# Patient Record
Sex: FEMALE | Race: WHITE | NOT HISPANIC OR LATINO
[De-identification: names, ages, dates, MRNs, and addresses within clinical notes are randomized per-mention and may not be internally consistent; named-entity substitution may affect disease eponyms.]

---

## 2017-08-03 ENCOUNTER — APPOINTMENT (OUTPATIENT)
Dept: OTOLARYNGOLOGY | Facility: CLINIC | Age: 37
End: 2017-08-03

## 2019-04-30 ENCOUNTER — TRANSCRIPTION ENCOUNTER (OUTPATIENT)
Age: 39
End: 2019-04-30

## 2021-05-16 ENCOUNTER — TRANSCRIPTION ENCOUNTER (OUTPATIENT)
Age: 41
End: 2021-05-16

## 2021-08-10 ENCOUNTER — TRANSCRIPTION ENCOUNTER (OUTPATIENT)
Age: 41
End: 2021-08-10

## 2022-03-29 ENCOUNTER — TRANSCRIPTION ENCOUNTER (OUTPATIENT)
Age: 42
End: 2022-03-29

## 2022-05-29 ENCOUNTER — NON-APPOINTMENT (OUTPATIENT)
Age: 42
End: 2022-05-29

## 2022-09-26 ENCOUNTER — EMERGENCY (EMERGENCY)
Facility: HOSPITAL | Age: 42
LOS: 1 days | Discharge: HOME | End: 2022-09-26
Attending: EMERGENCY MEDICINE | Admitting: EMERGENCY MEDICINE
Payer: SELF-PAY

## 2022-09-26 VITALS
OXYGEN SATURATION: 99 % | HEART RATE: 67 BPM | SYSTOLIC BLOOD PRESSURE: 120 MMHG | DIASTOLIC BLOOD PRESSURE: 81 MMHG | RESPIRATION RATE: 18 BRPM | TEMPERATURE: 98 F

## 2022-09-26 VITALS
TEMPERATURE: 98 F | DIASTOLIC BLOOD PRESSURE: 72 MMHG | SYSTOLIC BLOOD PRESSURE: 107 MMHG | OXYGEN SATURATION: 98 % | RESPIRATION RATE: 14 BRPM | WEIGHT: 229.94 LBS | HEIGHT: 64 IN | HEART RATE: 82 BPM

## 2022-09-26 DIAGNOSIS — Z98.89 OTHER SPECIFIED POSTPROCEDURAL STATES: Chronic | ICD-10-CM

## 2022-09-26 PROCEDURE — 99284 EMERGENCY DEPT VISIT MOD MDM: CPT | Mod: 25

## 2022-09-26 PROCEDURE — 73090 X-RAY EXAM OF FOREARM: CPT

## 2022-09-26 PROCEDURE — 73090 X-RAY EXAM OF FOREARM: CPT | Mod: 26,LT

## 2022-09-26 PROCEDURE — 96376 TX/PRO/DX INJ SAME DRUG ADON: CPT | Mod: XU

## 2022-09-26 PROCEDURE — 96374 THER/PROPH/DIAG INJ IV PUSH: CPT | Mod: XU

## 2022-09-26 PROCEDURE — 29125 APPL SHORT ARM SPLINT STATIC: CPT | Mod: RT

## 2022-09-26 PROCEDURE — 73110 X-RAY EXAM OF WRIST: CPT | Mod: 26,RT

## 2022-09-26 PROCEDURE — 99284 EMERGENCY DEPT VISIT MOD MDM: CPT | Mod: 57

## 2022-09-26 PROCEDURE — 25600 CLTX DST RDL FX/EPHYS SEP WO: CPT | Mod: 54

## 2022-09-26 PROCEDURE — 73110 X-RAY EXAM OF WRIST: CPT

## 2022-09-26 RX ORDER — OXYCODONE AND ACETAMINOPHEN 5; 325 MG/1; MG/1
1 TABLET ORAL
Qty: 16 | Refills: 0
Start: 2022-09-26 | End: 2022-09-29

## 2022-09-26 RX ORDER — MORPHINE SULFATE 50 MG/1
4 CAPSULE, EXTENDED RELEASE ORAL ONCE
Refills: 0 | Status: DISCONTINUED | OUTPATIENT
Start: 2022-09-26 | End: 2022-09-26

## 2022-09-26 RX ADMIN — MORPHINE SULFATE 4 MILLIGRAM(S): 50 CAPSULE, EXTENDED RELEASE ORAL at 13:00

## 2022-09-26 RX ADMIN — MORPHINE SULFATE 4 MILLIGRAM(S): 50 CAPSULE, EXTENDED RELEASE ORAL at 14:47

## 2022-09-26 RX ADMIN — MORPHINE SULFATE 4 MILLIGRAM(S): 50 CAPSULE, EXTENDED RELEASE ORAL at 14:04

## 2022-09-26 RX ADMIN — MORPHINE SULFATE 4 MILLIGRAM(S): 50 CAPSULE, EXTENDED RELEASE ORAL at 13:20
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
## 2022-09-26 NOTE — ED PROVIDER NOTE - GASTROINTESTINAL, MLM
Abdomen soft, non-tender, no guarding. No ecchymosis noted. No CVAT B. multiple soft and non tender abdominal hernias noted

## 2022-09-26 NOTE — ED PROVIDER NOTE - MUSCULOSKELETAL, MLM
++ttp with deformity and swelling noted to right distal radius with LROM secondary to pain, skin intact, fingers warm & mobile, nl cap refill, R shoulder/elbow/hand NT with FROM, distal pulses and sensation intact, NVI

## 2022-09-26 NOTE — ED PROVIDER NOTE - PATIENT PORTAL LINK FT
You can access the FollowMyHealth Patient Portal offered by [facility] by registering at the following website: [email or web address]. By joining [facility]’s FollowMyHealth portal, you will also be able to view your health information using other applications (apps) compatible with our system.
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
## 2022-09-26 NOTE — ED PROVIDER NOTE - NS ED ATTENDING STATEMENT MOD
This was a shared visit with the AG. I reviewed and verified the documentation and independently performed the documented:

## 2022-09-26 NOTE — ED PROVIDER NOTE - PROGRESS NOTE DETAILS
Reevaluated patient at bedside.  Patient feeling much improved.  Discussed the results of all diagnostic testing in ED and copies of all reports given.   An opportunity to ask questions was given.  Discussed the importance of prompt, close medical follow-up.  Patient will return with any changes, concerns or persistent / worsening symptoms.  Understanding of all instructions verbalized. Spoke to Dr. Ferris, ortho who reviewed xrays and advised to place wrist in volar splint, advised to d/c and will call pt to f/u in office today.   Reevaluated patient at bedside.  Patient feeling much improved. Discussed the results of all diagnostic testing in ED and copies of all reports given. R wrist placed in volar splint and sling, advised NWB R arm, elevate, ice compresses, will rx pain meds, f/u ortho.  An opportunity to ask questions was given.  Discussed the importance of prompt, close medical follow-up.  Patient will return with any changes, concerns or persistent / worsening symptoms.  Understanding of all instructions verbalized.

## 2022-09-26 NOTE — ED PROVIDER NOTE - NSICDXPASTSURGICALHX_GEN_ALL_CORE_FT
PAST SURGICAL HISTORY:  Disorder of tendon Right leg tendon transfer, 1987    Heel cord tightness repair of muscle cut in 1988    History of hernia surgery     History of tonsillectomy bilateral M&T, 1990    Other disorders of bone development and growth Left leg surgery to slow growth of leg to equalize the leg length of right leg, 1988    Status post breast lumpectomy right

## 2022-09-26 NOTE — ED PROVIDER NOTE - NSFOLLOWUPINSTRUCTIONS_ED_ALL_ED_FT
Follow-up with orthopedist for reevaluation, ongoing care and treatment.  Rest, nonweightbearing right arm.  Elevate arm with sling.  Take pain medication as prescribed.  Ice compresses.  If having worsening of symptoms or other related symptoms, return to the ER immediately.    Wrist Fracture Treated With Immobilization       A wrist fracture is a break or crack in one of the bones of your wrist. Your wrist is made of eight small bones at the palm of your hand (carpal bones) and two long bones of the forearm (radius and ulna).    A broken wrist is often treated by wearing a cast, splint, or sling (immobilization). This holds the broken pieces in place so they can heal.      What are the causes?    •A direct force to the wrist.      •Trauma, such as a car crash or falling on an outstretched hand.        What increases the risk?    •Doing contact sports or high-risk sports such as skiing, biking, and ice skating.      •Taking steroid medicines.      •Smoking.      •Being female.      •Being .      •Drinking more than three drinks that contain alcohol a day.      •Having a condition that weakens the bones (osteoporosis).      •Being older.      •Having had other broken bones in the past.        What are the signs or symptoms?    •Pain.       •Swelling.       •Bruising.       •Not being able to move the wrist normally.      •The wrist hanging in an odd position or looking oddly shaped.        How is this treated?    Treatment involves wearing a cast, splint, or sling. You may also need to take pain medicine. Once the injury has healed enough, you may begin doing range-of-motion exercises.      Follow these instructions at home:    If you have a cast:     • Do not stick anything inside the cast to scratch your skin.      •Check the skin around the cast every day. Tell your doctor if you see problems.      •You may put lotion on dry skin around the cast. Do not put lotion on the skin under the cast.      •Keep the cast clean and dry.      If you have a splint or sling:     •Wear the splint or sling as told by your doctor. Take it off only as told by your doctor.    •Loosen the splint or sling if your fingers:  •Tingle.      •Become numb.      •Turn cold and blue.        •Keep the splint or sling clean and dry.      Bathing     • Do not take baths, swim, or use a hot tub. Ask your doctor about taking showers or sponge baths.    •If your cast, splint, or sling is not waterproof:   •Do not let it get wet.      •Cover it with a watertight covering when you take a bath or shower.        •If you have a sling, take it off for bathing only if your doctor says this is okay.        Managing pain, stiffness, and swelling    •If told, put ice on the injured area. To do this:  •If you have a removable splint or sling, take it off as told by your doctor.      •Put ice in a plastic bag.      •Place a towel between your skin and the bag or between your cast and the bag.      •Leave the ice on for 20 minutes, 2–3 times a day.      •Take off the ice if your skin turns bright red. This is very important. If you cannot feel pain, heat, or cold, you have a greater risk of damage to the area.        •Move your fingers often.      •Raise the injured area above the level of your heart while you are sitting or lying down.      Activity     •Return to your normal activities when your doctor says that it is safe.      • Do not lift with or put weight on your injured wrist until your doctor says this is okay.      •Ask your doctor when it is safe to drive if you have a cast, splint, or sling on your wrist.      •Do range-of-motion exercises only as told by your doctor.      Medicines     •Take over-the-counter and prescription medicines only as told by your doctor.      •Ask your doctor if you should avoid driving or using machines while you are taking your medicine.      General instructions     • Do not put pressure on any part of the cast or splint until it is fully hardened.      • Do not smoke or use any products that contain nicotine or tobacco. If you need help quitting, ask your doctor.    •If told, take steps to prevent problems with pooping (constipation). You may need to:  •Drink enough fluid to keep your pee (urine) pale yellow.      •Take medicines. You will be told what medicines to take.      •Eat foods that are high in fiber. These include beans, whole grains, and fresh fruits and vegetables.      •Limit foods that are high in fat and sugar. These include fried or sweet foods.        •Keep all follow-up visits.        Contact a doctor if:    •Your cast, splint, or sling is damaged or loose.      •You have any new pain, swelling, or bruising.      •Your pain, swelling, and bruising do not get better.      •You have a fever.      •You have chills.        Get help right away if:    •Your skin or fingers on your injured arm turn blue or gray.      •Your arm feels cold or gets numb.      •You have very bad pain in your injured wrist.        Summary    •A wrist fracture is a break or crack in one of the bones of your wrist.      •A broken wrist is often treated by wearing a cast, splint, or sling.      •You should check the skin around a cast every day.      •Take off a splint or sling only as told by your doctor.      This information is not intended to replace advice given to you by your health care provider. Make sure you discuss any questions you have with your health care provider.

## 2022-09-26 NOTE — ED PROVIDER NOTE - NSICDXPASTMEDICALHX_GEN_ALL_CORE_FT
PAST MEDICAL HISTORY:  Abdominal hernia     Anemia     Breast cancer Right lumpectomy 6/2014, S/P radiation, last dose, 08/2014    Breast cancer right    Celiac disease     History of celiac disease     Inflammatory spondylopathy     Leiomyosarcoma of lower extremity, left left ankle    Obesity     Polio 5y/o
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
## 2022-09-26 NOTE — ED PROVIDER NOTE - CARE PROVIDER_API CALL
Juan Carlos Ferris)  Orthopaedic Surgery  [address] [place] [address], [address]  [place] [address]  Phone: [phone]  Fax: [phone]  Follow Up Time: 1-3 Days

## 2022-09-26 NOTE — ED PROCEDURE NOTE - ATTENDING APP SHARED VISIT CONTRIBUTION OF CARE
Was available for key portions of the procedure and agree with above patient is neurovascular intact pre and postprocedure

## 2023-11-18 ENCOUNTER — INPATIENT (INPATIENT)
Facility: HOSPITAL | Age: 43
LOS: 2 days | Discharge: HOME | DRG: 202 | End: 2023-11-21
Attending: INTERNAL MEDICINE | Admitting: INTERNAL MEDICINE
Payer: COMMERCIAL

## 2023-11-18 VITALS
OXYGEN SATURATION: 95 % | WEIGHT: 250 LBS | HEART RATE: 106 BPM | SYSTOLIC BLOOD PRESSURE: 134 MMHG | HEIGHT: 64 IN | RESPIRATION RATE: 20 BRPM | TEMPERATURE: 98 F | DIASTOLIC BLOOD PRESSURE: 83 MMHG

## 2023-11-18 DIAGNOSIS — J45.901 UNSPECIFIED ASTHMA WITH (ACUTE) EXACERBATION: ICD-10-CM

## 2023-11-18 DIAGNOSIS — Z98.89 OTHER SPECIFIED POSTPROCEDURAL STATES: Chronic | ICD-10-CM

## 2023-11-18 LAB
ALBUMIN SERPL ELPH-MCNC: 3.3 G/DL — SIGNIFICANT CHANGE UP (ref 3.3–5)
ALBUMIN SERPL ELPH-MCNC: 3.3 G/DL — SIGNIFICANT CHANGE UP (ref 3.3–5)
ALP SERPL-CCNC: 106 U/L — SIGNIFICANT CHANGE UP (ref 40–120)
ALP SERPL-CCNC: 106 U/L — SIGNIFICANT CHANGE UP (ref 40–120)
ALT FLD-CCNC: 21 U/L — SIGNIFICANT CHANGE UP (ref 12–78)
ALT FLD-CCNC: 21 U/L — SIGNIFICANT CHANGE UP (ref 12–78)
ANION GAP SERPL CALC-SCNC: 5 MMOL/L — SIGNIFICANT CHANGE UP (ref 5–17)
ANION GAP SERPL CALC-SCNC: 5 MMOL/L — SIGNIFICANT CHANGE UP (ref 5–17)
APPEARANCE UR: CLEAR — SIGNIFICANT CHANGE UP
APPEARANCE UR: CLEAR — SIGNIFICANT CHANGE UP
APTT BLD: 28.7 SEC — SIGNIFICANT CHANGE UP (ref 24.5–35.6)
APTT BLD: 28.7 SEC — SIGNIFICANT CHANGE UP (ref 24.5–35.6)
AST SERPL-CCNC: 13 U/L — LOW (ref 15–37)
AST SERPL-CCNC: 13 U/L — LOW (ref 15–37)
BASE EXCESS BLDA CALC-SCNC: -0.1 MMOL/L — SIGNIFICANT CHANGE UP (ref -2–3)
BASE EXCESS BLDA CALC-SCNC: -0.1 MMOL/L — SIGNIFICANT CHANGE UP (ref -2–3)
BASOPHILS # BLD AUTO: 0.06 K/UL — SIGNIFICANT CHANGE UP (ref 0–0.2)
BASOPHILS # BLD AUTO: 0.06 K/UL — SIGNIFICANT CHANGE UP (ref 0–0.2)
BASOPHILS NFR BLD AUTO: 0.3 % — SIGNIFICANT CHANGE UP (ref 0–2)
BASOPHILS NFR BLD AUTO: 0.3 % — SIGNIFICANT CHANGE UP (ref 0–2)
BILIRUB SERPL-MCNC: 0.2 MG/DL — SIGNIFICANT CHANGE UP (ref 0.2–1.2)
BILIRUB SERPL-MCNC: 0.2 MG/DL — SIGNIFICANT CHANGE UP (ref 0.2–1.2)
BILIRUB UR-MCNC: NEGATIVE — SIGNIFICANT CHANGE UP
BILIRUB UR-MCNC: NEGATIVE — SIGNIFICANT CHANGE UP
BLOOD GAS COMMENTS ARTERIAL: SIGNIFICANT CHANGE UP
BLOOD GAS COMMENTS ARTERIAL: SIGNIFICANT CHANGE UP
BUN SERPL-MCNC: 17 MG/DL — SIGNIFICANT CHANGE UP (ref 7–23)
BUN SERPL-MCNC: 17 MG/DL — SIGNIFICANT CHANGE UP (ref 7–23)
CALCIUM SERPL-MCNC: 8.4 MG/DL — LOW (ref 8.5–10.1)
CALCIUM SERPL-MCNC: 8.4 MG/DL — LOW (ref 8.5–10.1)
CHLORIDE SERPL-SCNC: 111 MMOL/L — HIGH (ref 96–108)
CHLORIDE SERPL-SCNC: 111 MMOL/L — HIGH (ref 96–108)
CO2 SERPL-SCNC: 29 MMOL/L — SIGNIFICANT CHANGE UP (ref 22–31)
CO2 SERPL-SCNC: 29 MMOL/L — SIGNIFICANT CHANGE UP (ref 22–31)
COLOR SPEC: YELLOW — SIGNIFICANT CHANGE UP
COLOR SPEC: YELLOW — SIGNIFICANT CHANGE UP
CREAT SERPL-MCNC: 0.49 MG/DL — LOW (ref 0.5–1.3)
CREAT SERPL-MCNC: 0.49 MG/DL — LOW (ref 0.5–1.3)
DIFF PNL FLD: NEGATIVE — SIGNIFICANT CHANGE UP
DIFF PNL FLD: NEGATIVE — SIGNIFICANT CHANGE UP
EGFR: 119 ML/MIN/1.73M2 — SIGNIFICANT CHANGE UP
EGFR: 119 ML/MIN/1.73M2 — SIGNIFICANT CHANGE UP
EOSINOPHIL # BLD AUTO: 0.15 K/UL — SIGNIFICANT CHANGE UP (ref 0–0.5)
EOSINOPHIL # BLD AUTO: 0.15 K/UL — SIGNIFICANT CHANGE UP (ref 0–0.5)
EOSINOPHIL NFR BLD AUTO: 0.7 % — SIGNIFICANT CHANGE UP (ref 0–6)
EOSINOPHIL NFR BLD AUTO: 0.7 % — SIGNIFICANT CHANGE UP (ref 0–6)
FLUAV AG NPH QL: SIGNIFICANT CHANGE UP
FLUAV AG NPH QL: SIGNIFICANT CHANGE UP
FLUBV AG NPH QL: SIGNIFICANT CHANGE UP
FLUBV AG NPH QL: SIGNIFICANT CHANGE UP
GAS PNL BLDA: SIGNIFICANT CHANGE UP
GAS PNL BLDA: SIGNIFICANT CHANGE UP
GLUCOSE SERPL-MCNC: 147 MG/DL — HIGH (ref 70–99)
GLUCOSE SERPL-MCNC: 147 MG/DL — HIGH (ref 70–99)
GLUCOSE UR QL: NEGATIVE MG/DL — SIGNIFICANT CHANGE UP
GLUCOSE UR QL: NEGATIVE MG/DL — SIGNIFICANT CHANGE UP
HCG SERPL-ACNC: 2 MIU/ML — SIGNIFICANT CHANGE UP
HCG SERPL-ACNC: 2 MIU/ML — SIGNIFICANT CHANGE UP
HCO3 BLDA-SCNC: 23 MMOL/L — SIGNIFICANT CHANGE UP (ref 21–28)
HCO3 BLDA-SCNC: 23 MMOL/L — SIGNIFICANT CHANGE UP (ref 21–28)
HCT VFR BLD CALC: 37.8 % — SIGNIFICANT CHANGE UP (ref 34.5–45)
HCT VFR BLD CALC: 37.8 % — SIGNIFICANT CHANGE UP (ref 34.5–45)
HGB BLD-MCNC: 12.5 G/DL — SIGNIFICANT CHANGE UP (ref 11.5–15.5)
HGB BLD-MCNC: 12.5 G/DL — SIGNIFICANT CHANGE UP (ref 11.5–15.5)
HOROWITZ INDEX BLDA+IHG-RTO: 21 — SIGNIFICANT CHANGE UP
HOROWITZ INDEX BLDA+IHG-RTO: 21 — SIGNIFICANT CHANGE UP
IMM GRANULOCYTES NFR BLD AUTO: 0.6 % — SIGNIFICANT CHANGE UP (ref 0–0.9)
IMM GRANULOCYTES NFR BLD AUTO: 0.6 % — SIGNIFICANT CHANGE UP (ref 0–0.9)
INR BLD: 0.91 RATIO — SIGNIFICANT CHANGE UP (ref 0.85–1.18)
INR BLD: 0.91 RATIO — SIGNIFICANT CHANGE UP (ref 0.85–1.18)
KETONES UR-MCNC: NEGATIVE MG/DL — SIGNIFICANT CHANGE UP
KETONES UR-MCNC: NEGATIVE MG/DL — SIGNIFICANT CHANGE UP
LACTATE SERPL-SCNC: 1.4 MMOL/L — SIGNIFICANT CHANGE UP (ref 0.7–2)
LACTATE SERPL-SCNC: 1.4 MMOL/L — SIGNIFICANT CHANGE UP (ref 0.7–2)
LEUKOCYTE ESTERASE UR-ACNC: ABNORMAL
LEUKOCYTE ESTERASE UR-ACNC: ABNORMAL
LYMPHOCYTES # BLD AUTO: 19.1 % — SIGNIFICANT CHANGE UP (ref 13–44)
LYMPHOCYTES # BLD AUTO: 19.1 % — SIGNIFICANT CHANGE UP (ref 13–44)
LYMPHOCYTES # BLD AUTO: 3.87 K/UL — HIGH (ref 1–3.3)
LYMPHOCYTES # BLD AUTO: 3.87 K/UL — HIGH (ref 1–3.3)
MCHC RBC-ENTMCNC: 27.4 PG — SIGNIFICANT CHANGE UP (ref 27–34)
MCHC RBC-ENTMCNC: 27.4 PG — SIGNIFICANT CHANGE UP (ref 27–34)
MCHC RBC-ENTMCNC: 33.1 GM/DL — SIGNIFICANT CHANGE UP (ref 32–36)
MCHC RBC-ENTMCNC: 33.1 GM/DL — SIGNIFICANT CHANGE UP (ref 32–36)
MCV RBC AUTO: 82.9 FL — SIGNIFICANT CHANGE UP (ref 80–100)
MCV RBC AUTO: 82.9 FL — SIGNIFICANT CHANGE UP (ref 80–100)
MONOCYTES # BLD AUTO: 1.04 K/UL — HIGH (ref 0–0.9)
MONOCYTES # BLD AUTO: 1.04 K/UL — HIGH (ref 0–0.9)
MONOCYTES NFR BLD AUTO: 5.1 % — SIGNIFICANT CHANGE UP (ref 2–14)
MONOCYTES NFR BLD AUTO: 5.1 % — SIGNIFICANT CHANGE UP (ref 2–14)
NEUTROPHILS # BLD AUTO: 15.02 K/UL — HIGH (ref 1.8–7.4)
NEUTROPHILS # BLD AUTO: 15.02 K/UL — HIGH (ref 1.8–7.4)
NEUTROPHILS NFR BLD AUTO: 74.2 % — SIGNIFICANT CHANGE UP (ref 43–77)
NEUTROPHILS NFR BLD AUTO: 74.2 % — SIGNIFICANT CHANGE UP (ref 43–77)
NITRITE UR-MCNC: NEGATIVE — SIGNIFICANT CHANGE UP
NITRITE UR-MCNC: NEGATIVE — SIGNIFICANT CHANGE UP
NRBC # BLD: 0 /100 WBCS — SIGNIFICANT CHANGE UP (ref 0–0)
NRBC # BLD: 0 /100 WBCS — SIGNIFICANT CHANGE UP (ref 0–0)
PCO2 BLDA: 27 MMHG — LOW (ref 32–35)
PCO2 BLDA: 27 MMHG — LOW (ref 32–35)
PH BLDA: 7.53 — HIGH (ref 7.35–7.45)
PH BLDA: 7.53 — HIGH (ref 7.35–7.45)
PH UR: 5.5 — SIGNIFICANT CHANGE UP (ref 5–8)
PH UR: 5.5 — SIGNIFICANT CHANGE UP (ref 5–8)
PLATELET # BLD AUTO: 376 K/UL — SIGNIFICANT CHANGE UP (ref 150–400)
PLATELET # BLD AUTO: 376 K/UL — SIGNIFICANT CHANGE UP (ref 150–400)
PO2 BLDA: 182 MMHG — HIGH (ref 83–108)
PO2 BLDA: 182 MMHG — HIGH (ref 83–108)
POTASSIUM SERPL-MCNC: 3.4 MMOL/L — LOW (ref 3.5–5.3)
POTASSIUM SERPL-MCNC: 3.4 MMOL/L — LOW (ref 3.5–5.3)
POTASSIUM SERPL-SCNC: 3.4 MMOL/L — LOW (ref 3.5–5.3)
POTASSIUM SERPL-SCNC: 3.4 MMOL/L — LOW (ref 3.5–5.3)
PROT SERPL-MCNC: 6.9 G/DL — SIGNIFICANT CHANGE UP (ref 6–8.3)
PROT SERPL-MCNC: 6.9 G/DL — SIGNIFICANT CHANGE UP (ref 6–8.3)
PROT UR-MCNC: NEGATIVE MG/DL — SIGNIFICANT CHANGE UP
PROT UR-MCNC: NEGATIVE MG/DL — SIGNIFICANT CHANGE UP
PROTHROM AB SERPL-ACNC: 10.7 SEC — SIGNIFICANT CHANGE UP (ref 9.5–13)
PROTHROM AB SERPL-ACNC: 10.7 SEC — SIGNIFICANT CHANGE UP (ref 9.5–13)
RBC # BLD: 4.56 M/UL — SIGNIFICANT CHANGE UP (ref 3.8–5.2)
RBC # BLD: 4.56 M/UL — SIGNIFICANT CHANGE UP (ref 3.8–5.2)
RBC # FLD: 14.2 % — SIGNIFICANT CHANGE UP (ref 10.3–14.5)
RBC # FLD: 14.2 % — SIGNIFICANT CHANGE UP (ref 10.3–14.5)
RSV RNA NPH QL NAA+NON-PROBE: SIGNIFICANT CHANGE UP
RSV RNA NPH QL NAA+NON-PROBE: SIGNIFICANT CHANGE UP
SAO2 % BLDA: 99.8 % — HIGH (ref 94–98)
SAO2 % BLDA: 99.8 % — HIGH (ref 94–98)
SARS-COV-2 RNA SPEC QL NAA+PROBE: SIGNIFICANT CHANGE UP
SARS-COV-2 RNA SPEC QL NAA+PROBE: SIGNIFICANT CHANGE UP
SODIUM SERPL-SCNC: 145 MMOL/L — SIGNIFICANT CHANGE UP (ref 135–145)
SODIUM SERPL-SCNC: 145 MMOL/L — SIGNIFICANT CHANGE UP (ref 135–145)
SP GR SPEC: 1.02 — SIGNIFICANT CHANGE UP (ref 1–1.03)
SP GR SPEC: 1.02 — SIGNIFICANT CHANGE UP (ref 1–1.03)
UROBILINOGEN FLD QL: 0.2 MG/DL — SIGNIFICANT CHANGE UP (ref 0.2–1)
UROBILINOGEN FLD QL: 0.2 MG/DL — SIGNIFICANT CHANGE UP (ref 0.2–1)
WBC # BLD: 20.27 K/UL — HIGH (ref 3.8–10.5)
WBC # BLD: 20.27 K/UL — HIGH (ref 3.8–10.5)
WBC # FLD AUTO: 20.27 K/UL — HIGH (ref 3.8–10.5)
WBC # FLD AUTO: 20.27 K/UL — HIGH (ref 3.8–10.5)

## 2023-11-18 PROCEDURE — 71275 CT ANGIOGRAPHY CHEST: CPT | Mod: 26

## 2023-11-18 PROCEDURE — 71046 X-RAY EXAM CHEST 2 VIEWS: CPT | Mod: 26

## 2023-11-18 PROCEDURE — 99285 EMERGENCY DEPT VISIT HI MDM: CPT

## 2023-11-18 PROCEDURE — 93010 ELECTROCARDIOGRAM REPORT: CPT

## 2023-11-18 RX ORDER — AZITHROMYCIN 500 MG/1
500 TABLET, FILM COATED ORAL EVERY 24 HOURS
Refills: 0 | Status: DISCONTINUED | OUTPATIENT
Start: 2023-11-19 | End: 2023-11-21

## 2023-11-18 RX ORDER — MONTELUKAST 4 MG/1
10 TABLET, CHEWABLE ORAL DAILY
Refills: 0 | Status: DISCONTINUED | OUTPATIENT
Start: 2023-11-18 | End: 2023-11-21

## 2023-11-18 RX ORDER — IPRATROPIUM/ALBUTEROL SULFATE 18-103MCG
3 AEROSOL WITH ADAPTER (GRAM) INHALATION ONCE
Refills: 0 | Status: COMPLETED | OUTPATIENT
Start: 2023-11-18 | End: 2023-11-18

## 2023-11-18 RX ORDER — POTASSIUM CHLORIDE 20 MEQ
20 PACKET (EA) ORAL
Refills: 0 | Status: COMPLETED | OUTPATIENT
Start: 2023-11-18 | End: 2023-11-19

## 2023-11-18 RX ORDER — AZITHROMYCIN 500 MG/1
TABLET, FILM COATED ORAL
Refills: 0 | Status: DISCONTINUED | OUTPATIENT
Start: 2023-11-18 | End: 2023-11-21

## 2023-11-18 RX ORDER — SODIUM CHLORIDE 9 MG/ML
1700 INJECTION INTRAMUSCULAR; INTRAVENOUS; SUBCUTANEOUS ONCE
Refills: 0 | Status: COMPLETED | OUTPATIENT
Start: 2023-11-18 | End: 2023-11-18

## 2023-11-18 RX ORDER — AZITHROMYCIN 500 MG/1
500 TABLET, FILM COATED ORAL ONCE
Refills: 0 | Status: COMPLETED | OUTPATIENT
Start: 2023-11-18 | End: 2023-11-18

## 2023-11-18 RX ORDER — SODIUM CHLORIDE 9 MG/ML
1000 INJECTION, SOLUTION INTRAVENOUS
Refills: 0 | Status: DISCONTINUED | OUTPATIENT
Start: 2023-11-18 | End: 2023-11-19

## 2023-11-18 RX ORDER — BUDESONIDE AND FORMOTEROL FUMARATE DIHYDRATE 160; 4.5 UG/1; UG/1
2 AEROSOL RESPIRATORY (INHALATION)
Refills: 0 | Status: DISCONTINUED | OUTPATIENT
Start: 2023-11-18 | End: 2023-11-21

## 2023-11-18 RX ORDER — ENOXAPARIN SODIUM 100 MG/ML
40 INJECTION SUBCUTANEOUS EVERY 24 HOURS
Refills: 0 | Status: DISCONTINUED | OUTPATIENT
Start: 2023-11-18 | End: 2023-11-21

## 2023-11-18 RX ORDER — MAGNESIUM SULFATE 500 MG/ML
2 VIAL (ML) INJECTION ONCE
Refills: 0 | Status: COMPLETED | OUTPATIENT
Start: 2023-11-18 | End: 2023-11-18

## 2023-11-18 RX ORDER — MAGNESIUM SULFATE 500 MG/ML
1 VIAL (ML) INJECTION ONCE
Refills: 0 | Status: DISCONTINUED | OUTPATIENT
Start: 2023-11-18 | End: 2023-11-18

## 2023-11-18 RX ORDER — GABAPENTIN 400 MG/1
600 CAPSULE ORAL THREE TIMES A DAY
Refills: 0 | Status: DISCONTINUED | OUTPATIENT
Start: 2023-11-18 | End: 2023-11-21

## 2023-11-18 RX ORDER — IPRATROPIUM/ALBUTEROL SULFATE 18-103MCG
3 AEROSOL WITH ADAPTER (GRAM) INHALATION EVERY 4 HOURS
Refills: 0 | Status: DISCONTINUED | OUTPATIENT
Start: 2023-11-18 | End: 2023-11-21

## 2023-11-18 RX ADMIN — Medication 600 MILLIGRAM(S): at 20:55

## 2023-11-18 RX ADMIN — SODIUM CHLORIDE 1700 MILLILITER(S): 9 INJECTION INTRAMUSCULAR; INTRAVENOUS; SUBCUTANEOUS at 23:37

## 2023-11-18 RX ADMIN — Medication 20 MILLIEQUIVALENT(S): at 23:02

## 2023-11-18 RX ADMIN — Medication 3 MILLILITER(S): at 20:55

## 2023-11-18 RX ADMIN — SODIUM CHLORIDE 1700 MILLILITER(S): 9 INJECTION INTRAMUSCULAR; INTRAVENOUS; SUBCUTANEOUS at 20:56

## 2023-11-18 RX ADMIN — Medication 125 MILLIGRAM(S): at 20:55

## 2023-11-18 RX ADMIN — Medication 3 MILLILITER(S): at 20:56

## 2023-11-18 RX ADMIN — Medication 100 MILLIGRAM(S): at 20:55

## 2023-11-18 NOTE — ED ADULT NURSE NOTE - OBJECTIVE STATEMENT
received pt from triage with asthma pt evaluated and blood work sent to lab  ekg done and reviewed xray done pt evaluated and admitted to [facility] no bed at present pt medicated as ordered and taken to ct scan

## 2023-11-18 NOTE — CONSULT NOTE ADULT - ASSESSMENT
Initial evaluation/Pulmonary Critical Care consultation requested by  DR DONIS  on  11/18/2023  from Dr Dustin Salter    Patient examined chart reviewed    HOSPITAL ADMISSION   PATIENT CAME  FROM (if information available)      GENERAL DATA .     GOC.  .. 11/18/2023 full code   ICU STAY.  .. none  COVID. .. scv2 11/18/2023 scv2 (-)    BEST PRACTICE ISSUES.    HOB ELEVATN.  .. Yes    DVT PPLX.  ..11/18/2023 lvnx 40  STRESS ULCER PPLX.  ..      INFECTION PPLX.  ..           SPEECH SWALLOW RECOMMENDATIONS.   ..        DIET.   ..  11/18/2023 regl    IV fl. .. 11/18/2023 1/2 ns 650  BOLUS. ..     ALLGY. ..  codeine gluten                        WT. ..  11/18/2023 113  BMI. ..          ABGS.   .     VS/ PO/IO/ VENT/ DRIPS.   11/18/2023 afeb 100 150/80   11/18/2023 ra 95%   [id number]  SUMMARY.  . 42-year-old female with past medical history of asthma without intubations, breast cancer,  anemia, hernias, leiomyosarcoma presents with cough for 2 weeks.  Patient reports cough with sputum production with associated wheezing.  Patient took Medrol Dosepak, doxycycline, nebs and albuterol inhaler which provided no relief.  Patient works at nursing facility   . Recent uv rxed with doxy and medrol started 2 w ago    . 11/18/2023 is on her second medrol pack     . [place] 4 [place]  . Quit smoking 20 y 1/2 ppd   . Has cat and dog  . ALLERGY Codeine gluten     . PMH  .. Abdominal hernia   .. Anemia   .. Breast cancer Right lumpectomy 6/2014, S/P radiation, last dose, 08/2014  .. Celiac disease   .. Leiomyosarcoma of lower extremity, left left ankle  .. Obesity   .. Polio 5 y/o.  .. has had covid x 4 times  last 2022     . HOME MEDS   .. Percocet 5/325 stopped  .. Fentanyl 100 stopped  .. dilaudid  stopped   .. gabapentin 600.3  .. proair     MAIN ISSUES/PLANS.  . RESP FAILURE.  .. 11/18/2023  Check abg     . ASTHMA EX.  .. 11/18/2023 Duoneb  .. 11/18/2023 symbicort  .. 11/18/2023 solumed   .. 11/18/2023 azithro   .. 11/18/2023 IV mag sulfate 2g   ..   . RO VTE.   .. 11/18/2023 Check venous duplx     TIME SPENT.  . Over 55 minutes aggregate care time spent on encounter; activities included   direct patient care, counseling and/or coordinating care reviewing notes, lab data/ imaging , discussion with multidisciplinary team/ patient  /family and explaining in detail risks, benefits, alternatives  of the recommendations     MARLA ARRINGTON

## 2023-11-18 NOTE — ED PROVIDER NOTE - CLINICAL SUMMARY MEDICAL DECISION MAKING FREE TEXT BOX
Patient is a 42-year-old female who presents to the emergency room with a chief complaint of shortness of breath.  Past medical history of asthma has required 3 prior hospitalizations last one about a year ago no prior intubations, history of breast cancer anemia hernia leiomyosarcoma.  Presenting today with cough for 2 weeks.  She reports the cough is mainly nonproductive but there is some intermittent sputum production.  She also reports associated wheezing and chest tightness.  She is followed up outpatient with urgent care was been prescribed a Medrol Dosepak doxycycline and nebs and an albuterol inhaler but has had no relief.  She is currently working in a nursing home was seen by Dr. Dickens who referred her to the emergency room to be seen by pulmonology Dr. Salter.  Denies any fevers chills vomiting abdominal pain.  On exam patient is lying in bed no acute distress.  Normocephalic atraumatic dry mucous membranes heart regular rate lungs diffuse wheezing noted abdomen soft nontender nondistended.  No peripheral edema noted.  Patient presenting to the emergency room with a chief complaint of shortness of breath.  Differential includes possible asthma exacerbation versus viral etiology versus bacterial pneumonia versus PE.  Will obtain screening labs check electrolytes obtain chest x-ray viral panel EKG.  Will medicate for asthma exacerbation and monitor.  Independent review of EKG reveals a sinus rhythm with sinus arrhythmia at a rate of 86 bpm

## 2023-11-18 NOTE — ED ADULT TRIAGE NOTE - NSWEIGHTCALCTOOLDRUG_GEN_A_CORE
Pre Dialysis Assessment: Received patient on bed resting on BIPAP w/ family on the bedside.    Pre Weight and Post Weight:  Pre wt = 86.3 kg. Post wt = 83.3 kg    Location/Access/Attempts: Lt AVF with good thrill and bruit X 1.    Dressing status/changed: Gauze removed.  Pressure dressing applied.    Ultra-filtration Goal/ Actual: 2-3L  UF of 3.0L    Treatment Length: 3.5  Hrs.    Dialysate (K+/Ca) Indicate if changed during treatment:   3.0 K, 2.5 Ca. No changes.    Summary of Treatment: Completed 3.5 hrs treatment. All blood returned. On levophed drip for BP support. Tolerated treatment well.    used

## 2023-11-18 NOTE — ED PROVIDER NOTE - PROGRESS NOTE DETAILS
pt still wheezing diffusely. consulted dr gooden puloswaldo advised admit. pt requests admission to dr muir. dr king covering pending call back dr king will kindly admit for dr muir

## 2023-11-18 NOTE — ED PROVIDER NOTE - DIFFERENTIAL DIAGNOSIS
Differential Diagnosis Patient presenting to the emergency room with a chief complaint of shortness of breath.  Differential includes possible asthma exacerbation versus viral etiology versus bacterial pneumonia versus PE.  Will obtain screening labs check electrolytes obtain chest x-ray viral panel EKG.  Will medicate for asthma exacerbation and monitor.

## 2023-11-18 NOTE — ED ADULT TRIAGE NOTE - CHIEF COMPLAINT QUOTE
Patient ambulatory to triage.  Patient is awake, alert, and A+O x 4.  Audible wheeze noted.  Patient has a cough and complaints of chest tightness.  Patient has a history of asthma and has used inhalers and nebulizers with no relief.  Patient has seen a doctor recently and was prescribed Medrol pack and is presently on day 3 of second pack.  Patient finished a course of doxycycline. No fever at this.

## 2023-11-18 NOTE — ED ADULT NURSE NOTE - NSFALLUNIVINTERV_ED_ALL_ED
Bed/Stretcher in lowest position, wheels locked, appropriate side rails in place/Call bell, personal items and telephone in reach/Instruct patient to call for assistance before getting out of bed/chair/stretcher/Non-slip footwear applied when patient is off stretcher/[place] to call system/Physically safe environment - no spills, clutter or unnecessary equipment/Purposeful proactive rounding/Room/bathroom lighting operational, light cord in reach

## 2023-11-18 NOTE — CONSULT NOTE ADULT - SUBJECTIVE AND OBJECTIVE BOX
CHIEF COMPLAINT/ REASON FOR VISIT  .. Patient was seen to address the  issue listed under PROBLEM LIST which is located toward bottom of this note     NURY IYER APER 21    Allergies    gluten (Nausea; Vomiting)  Gluten (Other)  codeine (Hives; Other; Swelling)    Intolerances        PAST MEDICAL & SURGICAL HISTORY:  Inflammatory spondylopathy      History of celiac disease      Polio  5y/o      Breast cancer  Right lumpectomy 2014, S/P radiation, last dose, 2014      Anemia      Obesity      Breast cancer  right      Abdominal hernia      Celiac disease      Leiomyosarcoma of lower extremity, left  left ankle      Disorder of tendon  Right leg tendon transfer,       Other disorders of bone development and growth  Left leg surgery to slow growth of leg to equalize the leg length of right leg,       History of tonsillectomy  bilateral M&T,       Heel cord tightness  repair of muscle cut in       Status post breast lumpectomy  right      History of hernia surgery          FAMILY HISTORY:      Home Medications:  Dilaudid 4 mg oral tablet: 1 tab(s) orally every 4 hours, As Needed (2017 14:35)  fentaNYL 100 mcg/hr transdermal film, extended release: patch transdermal every 72 hours (2017 14:35)  gabapentin:  orally  (2017 14:35)  Pepcid:  orally  (2017 14:35)  Percocet 5/325:  orally 1-2 tabs q 4-6 hours prn pain   (03 Oct 2014 20:15)      MEDICATIONS  (STANDING):  albuterol/ipratropium for Nebulization 3 milliLiter(s) Nebulizer every 4 hours  azithromycin  IVPB      azithromycin  IVPB 500 milliGRAM(s) IV Intermittent once  budesonide 160 MICROgram(s)/formoterol 4.5 MICROgram(s) Inhaler 2 Puff(s) Inhalation two times a day  enoxaparin Injectable 40 milliGRAM(s) SubCutaneous every 24 hours  gabapentin 600 milliGRAM(s) Oral three times a day  magnesium sulfate  IVPB 2 Gram(s) IV Intermittent once  methylPREDNISolone sodium succinate Injectable 40 milliGRAM(s) IV Push every 8 hours  montelukast 10 milliGRAM(s) Oral daily  potassium chloride    Tablet ER 20 milliEquivalent(s) Oral every 2 hours  sodium chloride 0.45%. 1000 milliLiter(s) (60 mL/Hr) IV Continuous <Continuous>    MEDICATIONS  (PRN):      Diet, Regular (23 @ 22:59) [Active]          Vital Signs Last 24 Hrs  T(C): 36.6 (2023 19:18), Max: 36.6 (2023 19:18)  T(F): 97.9 (2023 19:18), Max: 97.9 (2023 19:18)  HR: 106 (2023 19:18) (106 - 106)  BP: 134/83 (2023 19:18) (134/83 - 134/83)  BP(mean): --  RR: 20 (2023 19:18) (20 - 20)  SpO2: 95% (2023 19:18) (95% - 95%)    Parameters below as of 2023 19:18  Patient On (Oxygen Delivery Method): room air                  LABS:                        12.5   20.27 )-----------( 376      ( 2023 20:40 )             37.8         145  |  111<H>  |  17  ----------------------------<  147<H>  3.4<L>   |  29  |  0.49<L>    Ca    8.4<L>      2023 20:40    TPro  6.9  /  Alb  3.3  /  TBili  0.2  /  DBili  x   /  AST  13<L>  /  ALT  21  /  AlkPhos  106  18    PT/INR - ( 2023 20:40 )   PT: 10.7 sec;   INR: 0.91 ratio         PTT - ( 2023 20:40 )  PTT:28.7 sec  Urinalysis Basic - ( 2023 21:35 )    Color: Yellow / Appearance: Clear / S.018 / pH: x  Gluc: x / Ketone: Negative mg/dL  / Bili: Negative / Urobili: 0.2 mg/dL   Blood: x / Protein: Negative mg/dL / Nitrite: Negative   Leuk Esterase: Small / RBC: 1 /HPF / WBC 5 /HPF   Sq Epi: x / Non Sq Epi: x / Bacteria: x            WBC:  WBC Count: 20.27 K/uL ( @ 20:40)      MICROBIOLOGY:  RECENT CULTURES:              PT/INR - ( 2023 20:40 )   PT: 10.7 sec;   INR: 0.91 ratio         PTT - ( 2023 20:40 )  PTT:28.7 sec    Sodium:  Sodium: 145 mmol/L ( @ 20:40)      0.49 mg/dL  @ 20:40      Hemoglobin:  Hemoglobin: 12.5 g/dL ( @ 20:40)      Platelets: Platelet Count - Automated: 376 K/uL ( @ 20:40)      LIVER FUNCTIONS - ( 2023 20:40 )  Alb: 3.3 g/dL / Pro: 6.9 g/dL / ALK PHOS: 106 U/L / ALT: 21 U/L / AST: 13 U/L / GGT: x             Urinalysis Basic - ( 2023 21:35 )    Color: Yellow / Appearance: Clear / S.018 / pH: x  Gluc: x / Ketone: Negative mg/dL  / Bili: Negative / Urobili: 0.2 mg/dL   Blood: x / Protein: Negative mg/dL / Nitrite: Negative   Leuk Esterase: Small / RBC: 1 /HPF / WBC 5 /HPF   Sq Epi: x / Non Sq Epi: x / Bacteria: x        RADIOLOGY & ADDITIONAL STUDIES:      MICROBIOLOGY:  RECENT CULTURES:

## 2023-11-18 NOTE — CONSULT NOTE ADULT - SUBJECTIVE AND OBJECTIVE BOX
[facility] [place]     Patient is a 42y old  Female who presents with a chief complaint of     HPI:      HPI:    42y Female for Cardiology Consult    PAST MEDICAL & SURGICAL HISTORY:  Inflammatory spondylopathy      History of celiac disease      Polio  3y/o      Breast cancer  Right lumpectomy 2014, S/P radiation, last dose, 2014      Anemia      Obesity      Breast cancer  right      Abdominal hernia      Celiac disease      Leiomyosarcoma of lower extremity, left  left ankle      Disorder of tendon  Right leg tendon transfer,       Other disorders of bone development and growth  Left leg surgery to slow growth of leg to equalize the leg length of right leg,       History of tonsillectomy  bilateral M&T,       Heel cord tightness  repair of muscle cut in       Status post breast lumpectomy  right      History of hernia surgery          FAMILY HISTORY:      SOCIAL HISTORY:   Alcohol:  Smoking:    Allergies    gluten (Nausea; Vomiting)  Gluten (Other)  codeine (Hives; Other; Swelling)    Intolerances        MEDICATIONS  (STANDING):  albuterol/ipratropium for Nebulization 3 milliLiter(s) Nebulizer every 4 hours  azithromycin  IVPB      azithromycin  IVPB 500 milliGRAM(s) IV Intermittent once  azithromycin  IVPB 500 milliGRAM(s) IV Intermittent every 24 hours  budesonide 160 MICROgram(s)/formoterol 4.5 MICROgram(s) Inhaler 2 Puff(s) Inhalation two times a day  enoxaparin Injectable 40 milliGRAM(s) SubCutaneous every 24 hours  gabapentin 600 milliGRAM(s) Oral three times a day  magnesium sulfate  IVPB 2 Gram(s) IV Intermittent once  methylPREDNISolone sodium succinate Injectable 40 milliGRAM(s) IV Push every 8 hours  montelukast 10 milliGRAM(s) Oral daily  potassium chloride    Tablet ER 20 milliEquivalent(s) Oral every 2 hours  sodium chloride 0.45%. 1000 milliLiter(s) (60 mL/Hr) IV Continuous <Continuous>    MEDICATIONS  (PRN):      REVIEW OF SYSTEMS:  CONSTITUTIONAL: No fever, weight loss, chills, shakes, or fat  RESPIRATORY: No cough, wheezing, hemoptysis, or shortness of breath  CARDIOVASCULAR: No chest pain, dyspnea, palpitations, dizziness, syncope, paroxysmal nocturnal dyspnea, orthopnea, or arm or leg swelling  GASTROINTESTINAL: No abdominal  or epigastric pain, nausea, vomiting, hematemesis, diarrhea, constipation, melena or bright red bloo  NEUROLOGICAL: No headaches, memory loss, slurred speech, limb weakness, loss of strength, numbness, or tremors  SKIN: No itching, burning, rashes, or lesions  ENDOCRINE: No heat or cold intolerance, or hair loss  MUSCULOSKELETAL: No joint pain or swelling, muscle, back, or extremity pain  HEME/LYMPH: No easy bruising or bleeding gums  ALLERY AND IMMUNOLOGIC: No hives or rash.    Vital Signs Last 24 Hrs  T(C): 36.6 (2023 19:18), Max: 36.6 (2023 19:18)  T(F): 97.9 (2023 19:18), Max: 97.9 (2023 19:18)  HR: 106 (:18) (106 - 106)  BP: 134/83 (2023 19:18) (134/83 - 134/83)  BP(mean): --  RR: 20 (2023 19:18) (20 - 20)  SpO2: 95% (:18) (95% - 95%)    Parameters below as of 2023 19:18  Patient On (Oxygen Delivery Method): room air        PHYSICAL EXAM:  HEAD:  Atraumatic, Normocephalic  EYES: EOMI, PERRLA, conjunctiva and sclera clear  NECK: Supple and normal thyroid.  No JVD or carotid bruit.   HEART: S1, S2 regular , 1/6 soft ejection systolic murmur in mitral area , no thrill and no gallops .  PULMONARY: Bilateral vesicular breathing , few scattered ronchi and few scattered rales are present .  ABDOMEN: Soft nontender and positive bowl sounds   EXTREMITIES:  No clubbing, cyanosis, or pedal  edema  NEUROLOGICAL: AAOX3 , no focal deficit .    LABS:                        12.5   20.27 )-----------( 376      ( 2023 20:40 )             37.8     11-18    145  |  111<H>  |  17  ----------------------------<  147<H>  3.4<L>   |  29  |  0.49<L>    Ca    8.4<L>      2023 20:40    TPro  6.9  /  Alb  3.3  /  TBili  0.2  /  DBili  x   /  AST  13<L>  /  ALT  21  /  AlkPhos  106  11-18        PT/INR - ( 2023 20:40 )   PT: 10.7 sec;   INR: 0.91 ratio         PTT - ( 2023 20:40 )  PTT:28.7 sec  Urinalysis Basic - ( 2023 21:35 )    Color: Yellow / Appearance: Clear / S.018 / pH: x  Gluc: x / Ketone: Negative mg/dL  / Bili: Negative / Urobili: 0.2 mg/dL   Blood: x / Protein: Negative mg/dL / Nitrite: Negative   Leuk Esterase: Small / RBC: 1 /HPF / WBC 5 /HPF   Sq Epi: x / Non Sq Epi: x / Bacteria: x      BNP      EKG:  ECHO:  IMAGING:    Assessment and Plan :     Will continue to follow during hospital course and give further recommendations as needed . Thanks for your referral . if any questions please contact me at office ([phone] [phone])  JESSA TORREZ MD [place] [address]  [address]  OFFICE : [phone]  CELL : [phone]  CARDIOLOGY CONSULT :      Patient is a 42y old  Female who presents with a chief complaint of SOB , cough and wheezing .  · Chief Complaint: The patient is a 42y Female complaining of flu-like symptoms.  · HPI Objective Statement: Patient is a 42-year-old female with past medical history of asthma without intubations, breast cancer,  anemia, hernias, leiomyosarcoma presents with cough for 2 weeks.  Patient reports cough with sputum production with associated wheezing.  Patient took Medrol Dosepak, doxycycline, nebs and albuterol inhaler which provided no relief.  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  Dr. Salter referred her to ED for evaluation.  Patient denies fever, chest pain, nausea, vomiting, rash.  · Presenting Symptoms: COUGH, DIFFICULTY BREATHING, SHORTNESS OF BREATH, WHEEZING  · Negative Findings: no edema, no fever, no headache, no hemoptysis  · Timing: sudden onset  · Duration: week(s)  · Quality: productive cough  · Context: unknown  · Recent Exposure To: none known  · Aggravated Factors: none  · Relieving Factors: antibiotic use (recent), over the counter medication, prescription medication        HPI:    42y Female for Cardiology Consult    PAST MEDICAL & SURGICAL HISTORY:  Inflammatory spondylopathy      History of celiac disease      Polio  3y/o      Breast cancer  Right lumpectomy 2014, S/P radiation, last dose, 2014      Anemia      Obesity      Breast cancer  right      Abdominal hernia      Celiac disease      Leiomyosarcoma of lower extremity, left  left ankle      Disorder of tendon  Right leg tendon transfer,       Other disorders of bone development and growth  Left leg surgery to slow growth of leg to equalize the leg length of right leg,       History of tonsillectomy  bilateral M&T,       Heel cord tightness  repair of muscle cut in       Status post breast lumpectomy  right      History of hernia surgery          FAMILY HISTORY:      SOCIAL HISTORY:   Alcohol:  Smoking:    Allergies    gluten (Nausea; Vomiting)  Gluten (Other)  codeine (Hives; Other; Swelling)    Intolerances        MEDICATIONS  (STANDING):  albuterol/ipratropium for Nebulization 3 milliLiter(s) Nebulizer every 4 hours  azithromycin  IVPB      azithromycin  IVPB 500 milliGRAM(s) IV Intermittent once  azithromycin  IVPB 500 milliGRAM(s) IV Intermittent every 24 hours  budesonide 160 MICROgram(s)/formoterol 4.5 MICROgram(s) Inhaler 2 Puff(s) Inhalation two times a day  enoxaparin Injectable 40 milliGRAM(s) SubCutaneous every 24 hours  gabapentin 600 milliGRAM(s) Oral three times a day  magnesium sulfate  IVPB 2 Gram(s) IV Intermittent once  methylPREDNISolone sodium succinate Injectable 40 milliGRAM(s) IV Push every 8 hours  montelukast 10 milliGRAM(s) Oral daily  potassium chloride    Tablet ER 20 milliEquivalent(s) Oral every 2 hours  sodium chloride 0.45%. 1000 milliLiter(s) (60 mL/Hr) IV Continuous <Continuous>    MEDICATIONS  (PRN):      Vital Signs Last 24 Hrs  T(C): 36.6 (2023 19:18), Max: 36.6 (2023 19:18)  T(F): 97.9 (2023 19:18), Max: 97.9 (2023 19:18)  HR: 106 (2023 19:18) (106 - 106)  BP: 134/83 (2023 19:18) (134/83 - 134/83)  BP(mean): --  RR: 20 (2023 19:18) (20 - 20)  SpO2: 95% (2023 19:18) (95% - 95%)    Parameters below as of 2023 19:18  Patient On (Oxygen Delivery Method): room air        LABS:                        12.5   20.27 )-----------( 376      ( 2023 20:40 )             37.8         145  |  111<H>  |  17  ----------------------------<  147<H>  3.4<L>   |  29  |  0.49<L>    Ca    8.4<L>      2023 20:40    TPro  6.9  /  Alb  3.3  /  TBili  0.2  /  DBili  x   /  AST  13<L>  /  ALT  21  /  AlkPhos  106  11-18        PT/INR - ( 2023 20:40 )   PT: 10.7 sec;   INR: 0.91 ratio         PTT - ( 2023 20:40 )  PTT:28.7 sec  Urinalysis Basic - ( 2023 21:35 )    Color: Yellow / Appearance: Clear / S.018 / pH: x  Gluc: x / Ketone: Negative mg/dL  / Bili: Negative / Urobili: 0.2 mg/dL   Blood: x / Protein: Negative mg/dL / Nitrite: Negative   Leuk Esterase: Small / RBC: 1 /HPF / WBC 5 /HPF   Sq Epi: x / Non Sq Epi: x / Bacteria: x            EKG: NSR , non specific ST-T changes .    Assessment and Plan :   FULL CONSULT DICTATED .  42 years old female with H/O bronchial asthma , polio , bilateral mastectomy has SOB andcough and wheezing over last few days and significant wheezing and sharp chest pain with cough and chest pain is atypical . Continue I/V steroids and bronchodilators . Will also send  Troponin Levels and also get echocardiogram done Prognosis guarded .  Will continue to follow during hospital course and give further recommendations as needed . Thanks for your referral . if any questions please contact me at office ([phone] cell [phone])

## 2023-11-18 NOTE — ED PROVIDER NOTE - CARDIAC, MLM
Normal rate, regular rhythm.  Heart sounds S1, S2.  No murmurs, rubs or gallops.no le edema Universal Safety Interventions

## 2023-11-18 NOTE — ED PROVIDER NOTE - OBJECTIVE STATEMENT
Patient is a 42-year-old female with past medical history of asthma without intubations, breast cancer,  anemia, hernias, leiomyosarcoma presents with cough for 2 weeks.  Patient reports cough with sputum production with associated wheezing.  Patient took Medrol Dosepak, doxycycline, nebs and albuterol inhaler which provided no relief.  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  Dr. Salter referred her to ED for evaluation.  Patient denies fever, chest pain, nausea, vomiting, rash.

## 2023-11-19 DIAGNOSIS — G89.3 NEOPLASM RELATED PAIN (ACUTE) (CHRONIC): ICD-10-CM

## 2023-11-19 DIAGNOSIS — J20.9 ACUTE BRONCHITIS, UNSPECIFIED: ICD-10-CM

## 2023-11-19 LAB
ALBUMIN SERPL ELPH-MCNC: 3.2 G/DL — LOW (ref 3.3–5)
ALBUMIN SERPL ELPH-MCNC: 3.2 G/DL — LOW (ref 3.3–5)
ALP SERPL-CCNC: 103 U/L — SIGNIFICANT CHANGE UP (ref 40–120)
ALP SERPL-CCNC: 103 U/L — SIGNIFICANT CHANGE UP (ref 40–120)
ALT FLD-CCNC: 20 U/L — SIGNIFICANT CHANGE UP (ref 12–78)
ALT FLD-CCNC: 20 U/L — SIGNIFICANT CHANGE UP (ref 12–78)
ANION GAP SERPL CALC-SCNC: 4 MMOL/L — LOW (ref 5–17)
ANION GAP SERPL CALC-SCNC: 4 MMOL/L — LOW (ref 5–17)
AST SERPL-CCNC: 6 U/L — LOW (ref 15–37)
AST SERPL-CCNC: 6 U/L — LOW (ref 15–37)
BILIRUB SERPL-MCNC: 0.2 MG/DL — SIGNIFICANT CHANGE UP (ref 0.2–1.2)
BILIRUB SERPL-MCNC: 0.2 MG/DL — SIGNIFICANT CHANGE UP (ref 0.2–1.2)
BUN SERPL-MCNC: 14 MG/DL — SIGNIFICANT CHANGE UP (ref 7–23)
BUN SERPL-MCNC: 14 MG/DL — SIGNIFICANT CHANGE UP (ref 7–23)
CALCIUM SERPL-MCNC: 7.9 MG/DL — LOW (ref 8.5–10.1)
CALCIUM SERPL-MCNC: 7.9 MG/DL — LOW (ref 8.5–10.1)
CHLORIDE SERPL-SCNC: 112 MMOL/L — HIGH (ref 96–108)
CHLORIDE SERPL-SCNC: 112 MMOL/L — HIGH (ref 96–108)
CHOLEST SERPL-MCNC: 190 MG/DL — SIGNIFICANT CHANGE UP
CHOLEST SERPL-MCNC: 190 MG/DL — SIGNIFICANT CHANGE UP
CO2 SERPL-SCNC: 27 MMOL/L — SIGNIFICANT CHANGE UP (ref 22–31)
CO2 SERPL-SCNC: 27 MMOL/L — SIGNIFICANT CHANGE UP (ref 22–31)
CREAT SERPL-MCNC: 0.44 MG/DL — LOW (ref 0.5–1.3)
CREAT SERPL-MCNC: 0.44 MG/DL — LOW (ref 0.5–1.3)
D DIMER BLD IA.RAPID-MCNC: 166 NG/ML DDU — SIGNIFICANT CHANGE UP
D DIMER BLD IA.RAPID-MCNC: 166 NG/ML DDU — SIGNIFICANT CHANGE UP
EGFR: 122 ML/MIN/1.73M2 — SIGNIFICANT CHANGE UP
EGFR: 122 ML/MIN/1.73M2 — SIGNIFICANT CHANGE UP
GLUCOSE SERPL-MCNC: 181 MG/DL — HIGH (ref 70–99)
GLUCOSE SERPL-MCNC: 181 MG/DL — HIGH (ref 70–99)
HCT VFR BLD CALC: 36.8 % — SIGNIFICANT CHANGE UP (ref 34.5–45)
HCT VFR BLD CALC: 36.8 % — SIGNIFICANT CHANGE UP (ref 34.5–45)
HDLC SERPL-MCNC: 59 MG/DL — SIGNIFICANT CHANGE UP
HDLC SERPL-MCNC: 59 MG/DL — SIGNIFICANT CHANGE UP
HGB BLD-MCNC: 12.1 G/DL — SIGNIFICANT CHANGE UP (ref 11.5–15.5)
HGB BLD-MCNC: 12.1 G/DL — SIGNIFICANT CHANGE UP (ref 11.5–15.5)
INR BLD: 0.9 RATIO — SIGNIFICANT CHANGE UP (ref 0.85–1.18)
INR BLD: 0.9 RATIO — SIGNIFICANT CHANGE UP (ref 0.85–1.18)
LIPID PNL WITH DIRECT LDL SERPL: 116 MG/DL — HIGH
LIPID PNL WITH DIRECT LDL SERPL: 116 MG/DL — HIGH
MAGNESIUM SERPL-MCNC: 2.5 MG/DL — SIGNIFICANT CHANGE UP (ref 1.6–2.6)
MAGNESIUM SERPL-MCNC: 2.5 MG/DL — SIGNIFICANT CHANGE UP (ref 1.6–2.6)
MCHC RBC-ENTMCNC: 27.4 PG — SIGNIFICANT CHANGE UP (ref 27–34)
MCHC RBC-ENTMCNC: 27.4 PG — SIGNIFICANT CHANGE UP (ref 27–34)
MCHC RBC-ENTMCNC: 32.9 GM/DL — SIGNIFICANT CHANGE UP (ref 32–36)
MCHC RBC-ENTMCNC: 32.9 GM/DL — SIGNIFICANT CHANGE UP (ref 32–36)
MCV RBC AUTO: 83.4 FL — SIGNIFICANT CHANGE UP (ref 80–100)
MCV RBC AUTO: 83.4 FL — SIGNIFICANT CHANGE UP (ref 80–100)
NON HDL CHOLESTEROL: 131 MG/DL — HIGH
NON HDL CHOLESTEROL: 131 MG/DL — HIGH
NRBC # BLD: 0 /100 WBCS — SIGNIFICANT CHANGE UP (ref 0–0)
NRBC # BLD: 0 /100 WBCS — SIGNIFICANT CHANGE UP (ref 0–0)
NT-PROBNP SERPL-SCNC: 49 PG/ML — SIGNIFICANT CHANGE UP (ref 0–125)
NT-PROBNP SERPL-SCNC: 49 PG/ML — SIGNIFICANT CHANGE UP (ref 0–125)
PLATELET # BLD AUTO: 351 K/UL — SIGNIFICANT CHANGE UP (ref 150–400)
PLATELET # BLD AUTO: 351 K/UL — SIGNIFICANT CHANGE UP (ref 150–400)
POTASSIUM SERPL-MCNC: 3.6 MMOL/L — SIGNIFICANT CHANGE UP (ref 3.5–5.3)
POTASSIUM SERPL-MCNC: 3.6 MMOL/L — SIGNIFICANT CHANGE UP (ref 3.5–5.3)
POTASSIUM SERPL-SCNC: 3.6 MMOL/L — SIGNIFICANT CHANGE UP (ref 3.5–5.3)
POTASSIUM SERPL-SCNC: 3.6 MMOL/L — SIGNIFICANT CHANGE UP (ref 3.5–5.3)
PROCALCITONIN SERPL-MCNC: 0.02 NG/ML — SIGNIFICANT CHANGE UP
PROCALCITONIN SERPL-MCNC: 0.02 NG/ML — SIGNIFICANT CHANGE UP
PROT SERPL-MCNC: 6.4 G/DL — SIGNIFICANT CHANGE UP (ref 6–8.3)
PROT SERPL-MCNC: 6.4 G/DL — SIGNIFICANT CHANGE UP (ref 6–8.3)
PROTHROM AB SERPL-ACNC: 10.6 SEC — SIGNIFICANT CHANGE UP (ref 9.5–13)
PROTHROM AB SERPL-ACNC: 10.6 SEC — SIGNIFICANT CHANGE UP (ref 9.5–13)
RBC # BLD: 4.41 M/UL — SIGNIFICANT CHANGE UP (ref 3.8–5.2)
RBC # BLD: 4.41 M/UL — SIGNIFICANT CHANGE UP (ref 3.8–5.2)
RBC # FLD: 14.3 % — SIGNIFICANT CHANGE UP (ref 10.3–14.5)
RBC # FLD: 14.3 % — SIGNIFICANT CHANGE UP (ref 10.3–14.5)
SODIUM SERPL-SCNC: 143 MMOL/L — SIGNIFICANT CHANGE UP (ref 135–145)
SODIUM SERPL-SCNC: 143 MMOL/L — SIGNIFICANT CHANGE UP (ref 135–145)
TRIGL SERPL-MCNC: 84 MG/DL — SIGNIFICANT CHANGE UP
TRIGL SERPL-MCNC: 84 MG/DL — SIGNIFICANT CHANGE UP
TROPONIN I, HIGH SENSITIVITY RESULT: 3.6 NG/L — SIGNIFICANT CHANGE UP
TROPONIN I, HIGH SENSITIVITY RESULT: 3.6 NG/L — SIGNIFICANT CHANGE UP
TSH SERPL-MCNC: 0.28 UIU/ML — LOW (ref 0.36–3.74)
TSH SERPL-MCNC: 0.28 UIU/ML — LOW (ref 0.36–3.74)
WBC # BLD: 16.89 K/UL — HIGH (ref 3.8–10.5)
WBC # BLD: 16.89 K/UL — HIGH (ref 3.8–10.5)
WBC # FLD AUTO: 16.89 K/UL — HIGH (ref 3.8–10.5)
WBC # FLD AUTO: 16.89 K/UL — HIGH (ref 3.8–10.5)

## 2023-11-19 PROCEDURE — 93970 EXTREMITY STUDY: CPT | Mod: 26

## 2023-11-19 RX ORDER — GUAIFENESIN/DEXTROMETHORPHAN 600MG-30MG
10 TABLET, EXTENDED RELEASE 12 HR ORAL
Refills: 0 | Status: DISCONTINUED | OUTPATIENT
Start: 2023-11-19 | End: 2023-11-21

## 2023-11-19 RX ORDER — GABAPENTIN 400 MG/1
1 CAPSULE ORAL
Refills: 0 | DISCHARGE

## 2023-11-19 RX ORDER — ACETAMINOPHEN 500 MG
650 TABLET ORAL EVERY 6 HOURS
Refills: 0 | Status: DISCONTINUED | OUTPATIENT
Start: 2023-11-19 | End: 2023-11-21

## 2023-11-19 RX ADMIN — SODIUM CHLORIDE 60 MILLILITER(S): 9 INJECTION, SOLUTION INTRAVENOUS at 06:34

## 2023-11-19 RX ADMIN — BUDESONIDE AND FORMOTEROL FUMARATE DIHYDRATE 2 PUFF(S): 160; 4.5 AEROSOL RESPIRATORY (INHALATION) at 11:23

## 2023-11-19 RX ADMIN — MONTELUKAST 10 MILLIGRAM(S): 4 TABLET, CHEWABLE ORAL at 11:23

## 2023-11-19 RX ADMIN — BUDESONIDE AND FORMOTEROL FUMARATE DIHYDRATE 2 PUFF(S): 160; 4.5 AEROSOL RESPIRATORY (INHALATION) at 19:32

## 2023-11-19 RX ADMIN — Medication 10 MILLILITER(S): at 02:26

## 2023-11-19 RX ADMIN — Medication 100 MILLIGRAM(S): at 22:26

## 2023-11-19 RX ADMIN — Medication 10 MILLILITER(S): at 23:38

## 2023-11-19 RX ADMIN — Medication 40 MILLIGRAM(S): at 18:02

## 2023-11-19 RX ADMIN — GABAPENTIN 600 MILLIGRAM(S): 400 CAPSULE ORAL at 06:33

## 2023-11-19 RX ADMIN — AZITHROMYCIN 255 MILLIGRAM(S): 500 TABLET, FILM COATED ORAL at 23:38

## 2023-11-19 RX ADMIN — Medication 3 MILLILITER(S): at 19:38

## 2023-11-19 RX ADMIN — Medication 100 MILLIGRAM(S): at 15:03

## 2023-11-19 RX ADMIN — GABAPENTIN 600 MILLIGRAM(S): 400 CAPSULE ORAL at 22:26

## 2023-11-19 RX ADMIN — Medication 150 GRAM(S): at 01:50

## 2023-11-19 RX ADMIN — Medication 40 MILLIGRAM(S): at 02:04

## 2023-11-19 RX ADMIN — Medication 40 MILLIGRAM(S): at 11:23

## 2023-11-19 RX ADMIN — GABAPENTIN 600 MILLIGRAM(S): 400 CAPSULE ORAL at 15:04

## 2023-11-19 RX ADMIN — Medication 10 MILLILITER(S): at 11:23

## 2023-11-19 RX ADMIN — SODIUM CHLORIDE 60 MILLILITER(S): 9 INJECTION, SOLUTION INTRAVENOUS at 02:04

## 2023-11-19 RX ADMIN — Medication 40 MILLIGRAM(S): at 23:38

## 2023-11-19 RX ADMIN — Medication 40 MILLIGRAM(S): at 06:33

## 2023-11-19 RX ADMIN — Medication 3 MILLILITER(S): at 06:32

## 2023-11-19 RX ADMIN — ENOXAPARIN SODIUM 40 MILLIGRAM(S): 100 INJECTION SUBCUTANEOUS at 22:26

## 2023-11-19 RX ADMIN — Medication 20 MILLIEQUIVALENT(S): at 01:40

## 2023-11-19 RX ADMIN — AZITHROMYCIN 255 MILLIGRAM(S): 500 TABLET, FILM COATED ORAL at 01:39

## 2023-11-19 RX ADMIN — Medication 3 MILLILITER(S): at 23:12

## 2023-11-19 RX ADMIN — Medication 3 MILLILITER(S): at 15:24

## 2023-11-19 RX ADMIN — Medication 3 MILLILITER(S): at 01:41

## 2023-11-19 RX ADMIN — Medication 10 MILLILITER(S): at 18:02

## 2023-11-19 NOTE — H&P ADULT - PROBLEM SELECTOR PLAN 1
Admit  Continue iv solumedrol 40mg q6h, iv Zithromax 500mg qd  Duonebs q6h  Symbicort bid  Cough suppressants as needed  Oxygen prn  Pulmonary consult  Further work-up/management pending clinical course.

## 2023-11-19 NOTE — H&P ADULT - HISTORY OF PRESENT ILLNESS
This is a 42-year-old female with past medical history of asthma without intubations, breast cancer,  anemia, hernias, leiomyosarcoma presents with cough for 2 weeks.  Patient reports cough with sputum production with associated wheezing.  Patient took Medrol Dosepak, doxycycline, nebs and albuterol inhaler which provided no relief.  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  Dr. Salter referred her to ED for evaluation.  Patient denies fever, chest pain, nausea, vomiting, rash. This is a 42-year-old female with past medical history of asthma without intubations, breast cancer,  anemia, hernias, leiomyosarcoma presents with cough for 2 weeks.  Patient reports cough with sputum production with associated wheezing.  Patient took Medrol Dosepak, doxycycline, nebs and albuterol inhaler which provided no relief.  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  Dr. Salter referred her to ED for evaluation.  Patient denies fever, chest pain, nausea, vomiting, rash.

## 2023-11-19 NOTE — PROGRESS NOTE ADULT - ASSESSMENT
REASON FOR VISIT  .. Management of problems listed below        REVIEW OF SYMPTOMS   Able to give ROS  Yes     RELIABILITY +/-   CONSTITUTIONAL Weakness Yes    ENDOCRINE  No heat or cold intolerance    ALLERGY No hives  Sore throat No stridor  RESP Shortness of breath YES   NEURO New weakness No   CARDIAC   Palpitations No         PHYSICAL EXAM    HEENT Unremarkable  atraumatic   RESP Fair air entry  Harsh breath sound   CARDIAC S1 S2 No S3     NO JVD    ABDOMEN No hepatosplenomegaly   PEDAL EDEMA present No calf tenderness  NO rash       GENERAL DATA .     GOC.  .. 11/18/2023 full code   ICU STAY.  .. none  COVID. .. scv2 11/18/2023 scv2 (-)    BEST PRACTICE ISSUES.    HOB ELEVATN.  .. Yes    DVT PPLX.  ..11/18/2023 lvnx 40  STRESS ULCER PPLX.  ..      INFECTION PPLX.  ..           SPEECH SWALLOW RECOMMENDATIONS.   ..        DIET.   ..  11/18/2023 regl    IV fl. .. 11/18/2023 1/2 ns 65  BOLUS. ..     ALLGY. ..  codeine gluten                        WT. ..  11/18/2023 113  BMI. ..        PROCEDURES. ..     ABGS.   . 11/18/2023 11:29 PM ra 753/27/182     VS/ PO/IO/ VENT/ DRIPS.   11/19/2023 afeb 70 130/80   11/19/2023 ra 94%   11/18/2023 afeb 100 150/80   11/18/2023 ra 95%   [id number]  SUMMARY.  . 42-year-old female with past medical history of asthma without intubations, breast cancer,  anemia, hernias, leiomyosarcoma presents with cough for 2 weeks.  Patient reports cough with sputum production with associated wheezing.  Patient took Medrol Dosepak, doxycycline, nebs and albuterol inhaler which provided no relief.  Patient works at nursing facility   . Recent uv rxed with doxy and medrol started 2 w ago    . 11/18/2023 is on her second medrol pack     . In [place] age 4 [place]  . Quit smoking 20 y 1/2 ppd   . Has cat and dog  . ALLERGY Codeine gluten     . PMH  .. Abdominal hernia   .. Anemia   .. Breast cancer Right lumpectomy 6/2014, S/P radiation, last dose, 08/2014  .. Celiac disease   .. Leiomyosarcoma of lower extremity, left left ankle  .. Obesity   .. Polio 3 y/o.  .. has had covid x 4 times  last 2022     . HOME MEDS   .. Percocet 5/325 stopped  .. Fentanyl 100 stopped  .. dilaudid  stopped   .. gabapentin 600.3  .. proair     MAIN ISSUES/PLANS.  . RESP FAILURE.  .. 11/18/2023  abg showed resp alkalosis which is as expected in asthma     . RESP TRACT INFECTN  .. W 11/18-11/19/2023 w 20 - 16  .. pr 11/19/2023 pr (-)    .. Flu ab 11/18/2023 (-)  .. rsv 11/18/2023 (-)       . ASTHMA EX.  .. 11/18/2023 Duoneb  .. 11/18/2023 symbicort  .. 11/18/2023 solumed   .. 11/18/2023 azithro   .. 11/18/2023 IV mag sulfate 2g   .. 11/19/2023 Is feeling better but still wheezing  .. cont rx   .. Pt advised pulm followup after discharge     . RO VTE.   .. 11/18/2023 venous duplx (-)     TIME SPENT.  . Over 36 minutes aggregate care time spent on encounter; activities included   direct patient care, counseling and/or coordinating care reviewing notes, lab data/ imaging , discussion with multidisciplinary team/ patient  /family and explaining in detail risks, benefits, alternatives  of the recommendations     MARLA ARRINGTON

## 2023-11-19 NOTE — PATIENT PROFILE ADULT - SURGICAL SITE DESCRIPTION
left ankle, right ankle, left knee, abd midline, 6 hernias, bilat mastectomy, left ear scar, left thight

## 2023-11-19 NOTE — PROGRESS NOTE ADULT - SUBJECTIVE AND OBJECTIVE BOX
CHIEF COMPLAINT/ REASON FOR VISIT  .. Patient was seen to address the  issue listed under PROBLEM LIST which is located toward bottom of this note     NURY IYER APER 21    Allergies    gluten (Nausea; Vomiting)  Gluten (Other)  codeine (Hives; Other; Swelling)    Intolerances        PAST MEDICAL & SURGICAL HISTORY:  Inflammatory spondylopathy      History of celiac disease      Polio  3y/o      Breast cancer  Right lumpectomy 6/2014, S/P radiation, last dose, 08/2014      Anemia      Obesity      Breast cancer  right      Abdominal hernia      Celiac disease      Leiomyosarcoma of lower extremity, left  left ankle      Disorder of tendon  Right leg tendon transfer, 1987      Other disorders of bone development and growth  Left leg surgery to slow growth of leg to equalize the leg length of right leg, 1988      History of tonsillectomy  bilateral M&T, 1990      Heel cord tightness  repair of muscle cut in 1988      Status post breast lumpectomy  right      History of hernia surgery          FAMILY HISTORY:      Home Medications:  Dilaudid 4 mg oral tablet: 1 tab(s) orally every 4 hours, As Needed (27 Nov 2017 14:35)  fentaNYL 100 mcg/hr transdermal film, extended release: patch transdermal every 72 hours (27 Nov 2017 14:35)  gabapentin:  orally  (27 Nov 2017 14:35)  Pepcid:  orally  (27 Nov 2017 14:35)  Percocet 5/325:  orally 1-2 tabs q 4-6 hours prn pain   (03 Oct 2014 20:15)      MEDICATIONS  (STANDING):  albuterol/ipratropium for Nebulization 3 milliLiter(s) Nebulizer every 4 hours  azithromycin  IVPB      azithromycin  IVPB 500 milliGRAM(s) IV Intermittent every 24 hours  budesonide 160 MICROgram(s)/formoterol 4.5 MICROgram(s) Inhaler 2 Puff(s) Inhalation two times a day  enoxaparin Injectable 40 milliGRAM(s) SubCutaneous every 24 hours  gabapentin 600 milliGRAM(s) Oral three times a day  guaifenesin/dextromethorphan Oral Liquid 10 milliLiter(s) Oral four times a day  methylPREDNISolone sodium succinate Injectable 40 milliGRAM(s) IV Push every 6 hours  montelukast 10 milliGRAM(s) Oral daily  sodium chloride 0.45%. 1000 milliLiter(s) (60 mL/Hr) IV Continuous <Continuous>    MEDICATIONS  (PRN):  acetaminophen     Tablet .. 650 milliGRAM(s) Oral every 6 hours PRN Temp greater or equal to 38C (100.4F), Mild Pain (1 - 3)      Diet, Regular (11-18-23 @ 22:59) [Active]          Vital Signs Last 24 Hrs  T(C): 36.5 (19 Nov 2023 06:15), Max: 36.8 (19 Nov 2023 01:16)  T(F): 97.7 (19 Nov 2023 06:15), Max: 98.2 (19 Nov 2023 01:16)  HR: 68 (19 Nov 2023 06:15) (68 - 106)  BP: 113/54 (19 Nov 2023 06:15) (96/50 - 134/83)  BP(mean): --  RR: 18 (19 Nov 2023 06:15) (18 - 20)  SpO2: 94% (19 Nov 2023 06:15) (93% - 95%)    Parameters below as of 19 Nov 2023 06:15  Patient On (Oxygen Delivery Method): room air                  LABS:                        12.1   16.89 )-----------( 351      ( 19 Nov 2023 05:53 )             36.8     11-19    143  |  112<H>  |  14  ----------------------------<  181<H>  3.6   |  27  |  0.44<L>    Ca    7.9<L>      19 Nov 2023 05:53  Mg     2.5     11-19    TPro  6.4  /  Alb  3.2<L>  /  TBili  0.2  /  DBili  x   /  AST  6<L>  /  ALT  20  /  AlkPhos  103  11-19    PT/INR - ( 19 Nov 2023 05:53 )   PT: 10.6 sec;   INR: 0.90 ratio         PTT - ( 18 Nov 2023 20:40 )  PTT:28.7 sec  Urinalysis Basic - ( 19 Nov 2023 05:53 )    Color: x / Appearance: x / SG: x / pH: x  Gluc: 181 mg/dL / Ketone: x  / Bili: x / Urobili: x   Blood: x / Protein: x / Nitrite: x   Leuk Esterase: x / RBC: x / WBC x   Sq Epi: x / Non Sq Epi: x / Bacteria: x        ABG - ( 18 Nov 2023 22:59 )  pH, Arterial: 7.53  pH, Blood: x     /  pCO2: 27    /  pO2: 182   / HCO3: 23    / Base Excess: -0.1  /  SaO2: 99.8                WBC:  WBC Count: 16.89 K/uL (11-19 @ 05:53)  WBC Count: 20.27 K/uL (11-18 @ 20:40)      MICROBIOLOGY:  RECENT CULTURES:              PT/INR - ( 19 Nov 2023 05:53 )   PT: 10.6 sec;   INR: 0.90 ratio         PTT - ( 18 Nov 2023 20:40 )  PTT:28.7 sec    Sodium:  Sodium: 143 mmol/L (11-19 @ 05:53)  Sodium: 145 mmol/L (11-18 @ 20:40)      0.44 mg/dL 11-19 @ 05:53  0.49 mg/dL 11-18 @ 20:40      Hemoglobin:  Hemoglobin: 12.1 g/dL (11-19 @ 05:53)  Hemoglobin: 12.5 g/dL (11-18 @ 20:40)      Platelets: Platelet Count - Automated: 351 K/uL (11-19 @ 05:53)  Platelet Count - Automated: 376 K/uL (11-18 @ 20:40)      LIVER FUNCTIONS - ( 19 Nov 2023 05:53 )  Alb: 3.2 g/dL / Pro: 6.4 g/dL / ALK PHOS: 103 U/L / ALT: 20 U/L / AST: 6 U/L / GGT: x             Urinalysis Basic - ( 19 Nov 2023 05:53 )    Color: x / Appearance: x / SG: x / pH: x  Gluc: 181 mg/dL / Ketone: x  / Bili: x / Urobili: x   Blood: x / Protein: x / Nitrite: x   Leuk Esterase: x / RBC: x / WBC x   Sq Epi: x / Non Sq Epi: x / Bacteria: x        RADIOLOGY & ADDITIONAL STUDIES:      MICROBIOLOGY:  RECENT CULTURES:

## 2023-11-20 ENCOUNTER — TRANSCRIPTION ENCOUNTER (OUTPATIENT)
Age: 43
End: 2023-11-20

## 2023-11-20 LAB
ANION GAP SERPL CALC-SCNC: 6 MMOL/L — SIGNIFICANT CHANGE UP (ref 5–17)
ANION GAP SERPL CALC-SCNC: 6 MMOL/L — SIGNIFICANT CHANGE UP (ref 5–17)
BUN SERPL-MCNC: 16 MG/DL — SIGNIFICANT CHANGE UP (ref 7–23)
BUN SERPL-MCNC: 16 MG/DL — SIGNIFICANT CHANGE UP (ref 7–23)
CALCIUM SERPL-MCNC: 8.4 MG/DL — LOW (ref 8.5–10.1)
CALCIUM SERPL-MCNC: 8.4 MG/DL — LOW (ref 8.5–10.1)
CHLORIDE SERPL-SCNC: 113 MMOL/L — HIGH (ref 96–108)
CHLORIDE SERPL-SCNC: 113 MMOL/L — HIGH (ref 96–108)
CO2 SERPL-SCNC: 26 MMOL/L — SIGNIFICANT CHANGE UP (ref 22–31)
CO2 SERPL-SCNC: 26 MMOL/L — SIGNIFICANT CHANGE UP (ref 22–31)
CREAT SERPL-MCNC: 0.42 MG/DL — LOW (ref 0.5–1.3)
CREAT SERPL-MCNC: 0.42 MG/DL — LOW (ref 0.5–1.3)
EGFR: 124 ML/MIN/1.73M2 — SIGNIFICANT CHANGE UP
EGFR: 124 ML/MIN/1.73M2 — SIGNIFICANT CHANGE UP
GLUCOSE SERPL-MCNC: 136 MG/DL — HIGH (ref 70–99)
GLUCOSE SERPL-MCNC: 136 MG/DL — HIGH (ref 70–99)
GRAM STN FLD: ABNORMAL
GRAM STN FLD: ABNORMAL
HCT VFR BLD CALC: 37.5 % — SIGNIFICANT CHANGE UP (ref 34.5–45)
HCT VFR BLD CALC: 37.5 % — SIGNIFICANT CHANGE UP (ref 34.5–45)
HGB BLD-MCNC: 12.2 G/DL — SIGNIFICANT CHANGE UP (ref 11.5–15.5)
HGB BLD-MCNC: 12.2 G/DL — SIGNIFICANT CHANGE UP (ref 11.5–15.5)
MAGNESIUM SERPL-MCNC: 2.4 MG/DL — SIGNIFICANT CHANGE UP (ref 1.6–2.6)
MAGNESIUM SERPL-MCNC: 2.4 MG/DL — SIGNIFICANT CHANGE UP (ref 1.6–2.6)
MCHC RBC-ENTMCNC: 27.1 PG — SIGNIFICANT CHANGE UP (ref 27–34)
MCHC RBC-ENTMCNC: 27.1 PG — SIGNIFICANT CHANGE UP (ref 27–34)
MCHC RBC-ENTMCNC: 32.5 GM/DL — SIGNIFICANT CHANGE UP (ref 32–36)
MCHC RBC-ENTMCNC: 32.5 GM/DL — SIGNIFICANT CHANGE UP (ref 32–36)
MCV RBC AUTO: 83.1 FL — SIGNIFICANT CHANGE UP (ref 80–100)
MCV RBC AUTO: 83.1 FL — SIGNIFICANT CHANGE UP (ref 80–100)
MRSA PCR RESULT.: SIGNIFICANT CHANGE UP
MRSA PCR RESULT.: SIGNIFICANT CHANGE UP
NRBC # BLD: 0 /100 WBCS — SIGNIFICANT CHANGE UP (ref 0–0)
NRBC # BLD: 0 /100 WBCS — SIGNIFICANT CHANGE UP (ref 0–0)
PLATELET # BLD AUTO: 371 K/UL — SIGNIFICANT CHANGE UP (ref 150–400)
PLATELET # BLD AUTO: 371 K/UL — SIGNIFICANT CHANGE UP (ref 150–400)
POTASSIUM SERPL-MCNC: 3.8 MMOL/L — SIGNIFICANT CHANGE UP (ref 3.5–5.3)
POTASSIUM SERPL-MCNC: 3.8 MMOL/L — SIGNIFICANT CHANGE UP (ref 3.5–5.3)
POTASSIUM SERPL-SCNC: 3.8 MMOL/L — SIGNIFICANT CHANGE UP (ref 3.5–5.3)
POTASSIUM SERPL-SCNC: 3.8 MMOL/L — SIGNIFICANT CHANGE UP (ref 3.5–5.3)
RBC # BLD: 4.51 M/UL — SIGNIFICANT CHANGE UP (ref 3.8–5.2)
RBC # BLD: 4.51 M/UL — SIGNIFICANT CHANGE UP (ref 3.8–5.2)
RBC # FLD: 14.3 % — SIGNIFICANT CHANGE UP (ref 10.3–14.5)
RBC # FLD: 14.3 % — SIGNIFICANT CHANGE UP (ref 10.3–14.5)
S AUREUS DNA NOSE QL NAA+PROBE: SIGNIFICANT CHANGE UP
S AUREUS DNA NOSE QL NAA+PROBE: SIGNIFICANT CHANGE UP
SODIUM SERPL-SCNC: 145 MMOL/L — SIGNIFICANT CHANGE UP (ref 135–145)
SODIUM SERPL-SCNC: 145 MMOL/L — SIGNIFICANT CHANGE UP (ref 135–145)
SPECIMEN SOURCE: SIGNIFICANT CHANGE UP
SPECIMEN SOURCE: SIGNIFICANT CHANGE UP
WBC # BLD: 21.34 K/UL — HIGH (ref 3.8–10.5)
WBC # BLD: 21.34 K/UL — HIGH (ref 3.8–10.5)
WBC # FLD AUTO: 21.34 K/UL — HIGH (ref 3.8–10.5)
WBC # FLD AUTO: 21.34 K/UL — HIGH (ref 3.8–10.5)

## 2023-11-20 RX ADMIN — Medication 100 MILLIGRAM(S): at 05:50

## 2023-11-20 RX ADMIN — ENOXAPARIN SODIUM 40 MILLIGRAM(S): 100 INJECTION SUBCUTANEOUS at 21:51

## 2023-11-20 RX ADMIN — Medication 100 MILLIGRAM(S): at 21:51

## 2023-11-20 RX ADMIN — BUDESONIDE AND FORMOTEROL FUMARATE DIHYDRATE 2 PUFF(S): 160; 4.5 AEROSOL RESPIRATORY (INHALATION) at 19:23

## 2023-11-20 RX ADMIN — Medication 10 MILLILITER(S): at 17:21

## 2023-11-20 RX ADMIN — Medication 3 MILLILITER(S): at 12:24

## 2023-11-20 RX ADMIN — GABAPENTIN 600 MILLIGRAM(S): 400 CAPSULE ORAL at 14:01

## 2023-11-20 RX ADMIN — Medication 40 MILLIGRAM(S): at 11:47

## 2023-11-20 RX ADMIN — MONTELUKAST 10 MILLIGRAM(S): 4 TABLET, CHEWABLE ORAL at 11:47

## 2023-11-20 RX ADMIN — Medication 3 MILLILITER(S): at 15:21

## 2023-11-20 RX ADMIN — BUDESONIDE AND FORMOTEROL FUMARATE DIHYDRATE 2 PUFF(S): 160; 4.5 AEROSOL RESPIRATORY (INHALATION) at 05:51

## 2023-11-20 RX ADMIN — Medication 100 MILLIGRAM(S): at 14:01

## 2023-11-20 RX ADMIN — Medication 3 MILLILITER(S): at 03:35

## 2023-11-20 RX ADMIN — Medication 3 MILLILITER(S): at 23:28

## 2023-11-20 RX ADMIN — Medication 40 MILLIGRAM(S): at 05:51

## 2023-11-20 RX ADMIN — Medication 3 MILLILITER(S): at 19:40

## 2023-11-20 RX ADMIN — Medication 3 MILLILITER(S): at 07:41

## 2023-11-20 RX ADMIN — GABAPENTIN 600 MILLIGRAM(S): 400 CAPSULE ORAL at 05:51

## 2023-11-20 RX ADMIN — Medication 10 MILLILITER(S): at 23:15

## 2023-11-20 RX ADMIN — Medication 10 MILLILITER(S): at 05:50

## 2023-11-20 RX ADMIN — GABAPENTIN 600 MILLIGRAM(S): 400 CAPSULE ORAL at 21:51

## 2023-11-20 RX ADMIN — Medication 40 MILLIGRAM(S): at 17:21

## 2023-11-20 RX ADMIN — AZITHROMYCIN 255 MILLIGRAM(S): 500 TABLET, FILM COATED ORAL at 23:15

## 2023-11-20 RX ADMIN — Medication 10 MILLILITER(S): at 11:47

## 2023-11-20 NOTE — CARE COORDINATION ASSESSMENT. - NSCAREPROVIDERS_GEN_ALL_CORE_FT
CARE PROVIDERS:  Accepting Physician: Aditi Dickens  Administration: Jean Carlos Ayala  Admitting: Aditi Dickens  Attending: Aditi Dickens  Case Management: Benji Christensen  Case Management: Antionette Gonsalves  Consultant: Syd Gerardo  Consultant: Dustin Salter  Covering Team: Morales Graff  ED ACP: Divya Obrien  ED Attending: Dara Lopez ED Nurse: Becca Benites  Nurse: Elizabeth Rogers  Ordered: ServiceAccount, [facility]MLM  Ordered: ADM, User  Ordered: Doctor, Unknown  Override: Baron Triana  Override: Eleni Roa  Override: Dora Harrison  Override: Gustavo Morales  Override: Carolynn Valadez  PCA/Nursing Assistant: Juwan Hays  PCA/Nursing Assistant: Vicky Roberson  PCA/Nursing Assistant: Izabella Rojas  Primary Team: Zoey Perez  Registered Dietitian: Alexandra Dawkins  Respiratory Therapy: Pearl Browne  : Viviana Alba

## 2023-11-20 NOTE — PROGRESS NOTE ADULT - SUBJECTIVE AND OBJECTIVE BOX
Date of Service: 11-20-23 @ 13:04    Patient is a 42y old  Female who presents with a chief complaint of SOB (20 Nov 2023 09:31)      INTERVAL HPI/OVERNIGHT EVENTS: Patient seen and examined. NAD. Feeling better.    Vital Signs Last 24 Hrs  T(C): 37 (20 Nov 2023 12:20), Max: 37 (19 Nov 2023 20:57)  T(F): 98.6 (20 Nov 2023 12:20), Max: 98.6 (19 Nov 2023 20:57)  HR: 69 (20 Nov 2023 12:20) (57 - 86)  BP: 112/69 (20 Nov 2023 12:20) (112/57 - 144/83)  BP(mean): --  RR: 18 (20 Nov 2023 12:20) (17 - 18)  SpO2: 94% (20 Nov 2023 12:20) (90% - 98%)    Parameters below as of 20 Nov 2023 12:20  Patient On (Oxygen Delivery Method): room air        11-20    145  |  113<H>  |  16  ----------------------------<  136<H>  3.8   |  26  |  0.42<L>    Ca    8.4<L>      20 Nov 2023 08:00  Mg     2.4     11-20    TPro  6.4  /  Alb  3.2<L>  /  TBili  0.2  /  DBili  x   /  AST  6<L>  /  ALT  20  /  AlkPhos  103  11-19                          12.2   21.34 )-----------( 371      ( 20 Nov 2023 08:00 )             37.5     PT/INR - ( 19 Nov 2023 05:53 )   PT: 10.6 sec;   INR: 0.90 ratio         PTT - ( 18 Nov 2023 20:40 )  PTT:28.7 sec  CAPILLARY BLOOD GLUCOSE        Urinalysis Basic - ( 20 Nov 2023 08:00 )    Color: x / Appearance: x / SG: x / pH: x  Gluc: 136 mg/dL / Ketone: x  / Bili: x / Urobili: x   Blood: x / Protein: x / Nitrite: x   Leuk Esterase: x / RBC: x / WBC x   Sq Epi: x / Non Sq Epi: x / Bacteria: x              acetaminophen     Tablet .. 650 milliGRAM(s) Oral every 6 hours PRN  albuterol/ipratropium for Nebulization 3 milliLiter(s) Nebulizer every 4 hours  azithromycin  IVPB      azithromycin  IVPB 500 milliGRAM(s) IV Intermittent every 24 hours  benzonatate 100 milliGRAM(s) Oral three times a day  budesonide 160 MICROgram(s)/formoterol 4.5 MICROgram(s) Inhaler 2 Puff(s) Inhalation two times a day  enoxaparin Injectable 40 milliGRAM(s) SubCutaneous every 24 hours  gabapentin 600 milliGRAM(s) Oral three times a day  guaifenesin/dextromethorphan Oral Liquid 10 milliLiter(s) Oral four times a day  methylPREDNISolone sodium succinate Injectable 40 milliGRAM(s) IV Push every 6 hours  montelukast 10 milliGRAM(s) Oral daily              REVIEW OF SYSTEMS:  CONSTITUTIONAL: No fever, no weight loss, or no fatigue  NECK: No pain, no stiffness  RESPIRATORY: + cough, + wheezing, no chills, no hemoptysis, + shortness of breath  CARDIOVASCULAR: No chest pain, no palpitations, no dizziness, no leg swelling  GASTROINTESTINAL: No abdominal pain. No nausea, no vomiting, no hematemesis; No diarrhea, no constipation. No melena, no hematochezia.  GENITOURINARY: No dysuria, no frequency, no hematuria, no incontinence  NEUROLOGICAL: No headaches, no loss of strength, no numbness, no tremors  SKIN: No itching, no burning  MUSCULOSKELETAL: No joint pain, no swelling; No muscle, no back, no extremity pain  PSYCHIATRIC: No depression, no mood swings,   HEME/LYMPH: No easy bruising, no bleeding gums  ALLERY AND IMMUNOLOGIC: No hives       Consultant(s) Notes Reviewed:  [X] YES  [ ] NO    PHYSICAL EXAM:  GENERAL: NAD  HEAD:  Atraumatic, Normocephalic  EYES: EOMI, PERRLA, conjunctiva and sclera clear  ENMT: No tonsillar erythema, exudates, or enlargement; Moist mucous membranes  NECK: Supple, No JVD  NERVOUS SYSTEM:  Awake & alert  CHEST/LUNG: b/l wheezing   HEART: Regular rate and rhythm  ABDOMEN: Soft, Nontender, Nondistended; Bowel sounds present  EXTREMITIES:  No clubbing, cyanosis, or edema  LYMPH: No lymphadenopathy noted  SKIN: No rashes      Advanced care planning discussed with patient/family [X] YES   [ ] NO    Advanced care planning discussed with patient/family. Patient's health status was discussed. All appropriate changes have been made regarding patient's end-of-life care. Advanced care planning forms reviewed/discussed/completed.  20 minutes spent.

## 2023-11-20 NOTE — CARE COORDINATION ASSESSMENT. - NSPASTMEDSURGHISTORY_GEN_ALL_CORE_FT
PAST MEDICAL & SURGICAL HISTORY:  Obesity      Anemia      Breast cancer  Right lumpectomy 6/2014, S/P radiation, last dose, 08/2014      Polio  3y/o      History of celiac disease      Inflammatory spondylopathy      Heel cord tightness  repair of muscle cut in 1988      History of tonsillectomy  bilateral M&T, 1990      Other disorders of bone development and growth  Left leg surgery to slow growth of leg to equalize the leg length of right leg, 1988      Disorder of tendon  Right leg tendon transfer, 1987      Leiomyosarcoma of lower extremity, left  left ankle      Celiac disease      Abdominal hernia      Breast cancer  right      History of hernia surgery      Status post breast lumpectomy  right

## 2023-11-20 NOTE — CARE COORDINATION ASSESSMENT. - OTHER PERTINENT DISCHARGE PLANNING INFORMATION:
Pt presented to ED with c/o productive cough and wheezing.  Given IV zithro in ED and IV medrol.  Admitted for IV abx and medrol.  Confirmed PMD is Dr. Pederson.

## 2023-11-20 NOTE — DISCHARGE NOTE PROVIDER - NSDCMRMEDTOKEN_GEN_ALL_CORE_FT
Neurontin 600 mg oral tablet: 1 tab(s) orally 3 times a day   benzonatate 100 mg oral capsule: 1 cap(s) orally 3 times a day  budesonide-formoterol 160 mcg-4.5 mcg/inh inhalation aerosol: 2 puff(s) inhaled 2 times a day  montelukast 10 mg oral tablet: 1 tab(s) orally once a day  Neurontin 600 mg oral tablet: 1 tab(s) orally 3 times a day  predniSONE 5 mg oral tablet: 6 tab(s) orally once Then taper by 5mg everyday (25mg, 20mg, etc.)

## 2023-11-20 NOTE — PROGRESS NOTE ADULT - SUBJECTIVE AND OBJECTIVE BOX
CHIEF COMPLAINT/ REASON FOR VISIT  .. Patient was seen to address the  issue listed under PROBLEM LIST which is located toward bottom of this note     NURY GUTIÉRREZ    [facility] [address]    Allergies    gluten (Nausea; Vomiting)  Gluten (Other)  codeine (Hives; Other; Swelling)    Intolerances        PAST MEDICAL & SURGICAL HISTORY:  Inflammatory spondylopathy      History of celiac disease      Polio  3y/o      Breast cancer  Right lumpectomy 6/2014, S/P radiation, last dose, 08/2014      Anemia      Obesity      Breast cancer  right      Abdominal hernia      Celiac disease      Leiomyosarcoma of lower extremity, left  left ankle      Disorder of tendon  Right leg tendon transfer, 1987      Other disorders of bone development and growth  Left leg surgery to slow growth of leg to equalize the leg length of right leg, 1988      History of tonsillectomy  bilateral M&T, 1990      Heel cord tightness  repair of muscle cut in 1988      Status post breast lumpectomy  right      History of hernia surgery          FAMILY HISTORY:      Home Medications:  Neurontin 600 mg oral tablet: 1 tab(s) orally 3 times a day (19 Nov 2023 10:18)      MEDICATIONS  (STANDING):  albuterol/ipratropium for Nebulization 3 milliLiter(s) Nebulizer every 4 hours  azithromycin  IVPB      azithromycin  IVPB 500 milliGRAM(s) IV Intermittent every 24 hours  benzonatate 100 milliGRAM(s) Oral three times a day  budesonide 160 MICROgram(s)/formoterol 4.5 MICROgram(s) Inhaler 2 Puff(s) Inhalation two times a day  enoxaparin Injectable 40 milliGRAM(s) SubCutaneous every 24 hours  gabapentin 600 milliGRAM(s) Oral three times a day  guaifenesin/dextromethorphan Oral Liquid 10 milliLiter(s) Oral four times a day  methylPREDNISolone sodium succinate Injectable 40 milliGRAM(s) IV Push every 6 hours  montelukast 10 milliGRAM(s) Oral daily    MEDICATIONS  (PRN):  acetaminophen     Tablet .. 650 milliGRAM(s) Oral every 6 hours PRN Temp greater or equal to 38C (100.4F), Mild Pain (1 - 3)      Diet, Regular:   Gluten-Gliadin Restricted (11-19-23 @ 10:17) [Active]          Vital Signs Last 24 Hrs  T(C): 36.7 (20 Nov 2023 03:57), Max: 37 (19 Nov 2023 20:57)  T(F): 98 (20 Nov 2023 03:57), Max: 98.6 (19 Nov 2023 20:57)  HR: 60 (20 Nov 2023 07:41) (57 - 86)  BP: 144/83 (20 Nov 2023 03:57) (112/57 - 144/83)  BP(mean): --  RR: 18 (20 Nov 2023 03:57) (17 - 18)  SpO2: 91% (20 Nov 2023 07:41) (90% - 98%)    Parameters below as of 20 Nov 2023 07:41  Patient On (Oxygen Delivery Method): room air          11-19-23 @ 07:01  -  11-20-23 @ 07:00  --------------------------------------------------------  IN: 250 mL / OUT: 0 mL / NET: 250 mL              LABS:                        12.2   21.34 )-----------( 371      ( 20 Nov 2023 08:00 )             37.5     11-20    145  |  113<H>  |  16  ----------------------------<  136<H>  3.8   |  26  |  0.42<L>    Ca    8.4<L>      20 Nov 2023 08:00  Mg     2.4     11-20    TPro  6.4  /  Alb  3.2<L>  /  TBili  0.2  /  DBili  x   /  AST  6<L>  /  ALT  20  /  AlkPhos  103  11-19    PT/INR - ( 19 Nov 2023 05:53 )   PT: 10.6 sec;   INR: 0.90 ratio         PTT - ( 18 Nov 2023 20:40 )  PTT:28.7 sec  Urinalysis Basic - ( 20 Nov 2023 08:00 )    Color: x / Appearance: x / SG: x / pH: x  Gluc: 136 mg/dL / Ketone: x  / Bili: x / Urobili: x   Blood: x / Protein: x / Nitrite: x   Leuk Esterase: x / RBC: x / WBC x   Sq Epi: x / Non Sq Epi: x / Bacteria: x        ABG - ( 18 Nov 2023 22:59 )  pH, Arterial: 7.53  pH, Blood: x     /  pCO2: 27    /  pO2: 182   / HCO3: 23    / Base Excess: -0.1  /  SaO2: 99.8                WBC:  WBC Count: 21.34 K/uL (11-20 @ 08:00)  WBC Count: 16.89 K/uL (11-19 @ 05:53)  WBC Count: 20.27 K/uL (11-18 @ 20:40)      MICROBIOLOGY:  RECENT CULTURES:  11-18 .Blood Blood-Peripheral XXXX XXXX   No growth at 24 hours                PT/INR - ( 19 Nov 2023 05:53 )   PT: 10.6 sec;   INR: 0.90 ratio         PTT - ( 18 Nov 2023 20:40 )  PTT:28.7 sec    Sodium:  Sodium: 145 mmol/L (11-20 @ 08:00)  Sodium: 143 mmol/L (11-19 @ 05:53)  Sodium: 145 mmol/L (11-18 @ 20:40)      0.42 mg/dL 11-20 @ 08:00  0.44 mg/dL 11-19 @ 05:53  0.49 mg/dL 11-18 @ 20:40      Hemoglobin:  Hemoglobin: 12.2 g/dL (11-20 @ 08:00)  Hemoglobin: 12.1 g/dL (11-19 @ 05:53)  Hemoglobin: 12.5 g/dL (11-18 @ 20:40)      Platelets: Platelet Count - Automated: 371 K/uL (11-20 @ 08:00)  Platelet Count - Automated: 351 K/uL (11-19 @ 05:53)  Platelet Count - Automated: 376 K/uL (11-18 @ 20:40)      LIVER FUNCTIONS - ( 19 Nov 2023 05:53 )  Alb: 3.2 g/dL / Pro: 6.4 g/dL / ALK PHOS: 103 U/L / ALT: 20 U/L / AST: 6 U/L / GGT: x             Urinalysis Basic - ( 20 Nov 2023 08:00 )    Color: x / Appearance: x / SG: x / pH: x  Gluc: 136 mg/dL / Ketone: x  / Bili: x / Urobili: x   Blood: x / Protein: x / Nitrite: x   Leuk Esterase: x / RBC: x / WBC x   Sq Epi: x / Non Sq Epi: x / Bacteria: x        RADIOLOGY & ADDITIONAL STUDIES:      MICROBIOLOGY:  RECENT CULTURES:  11-18 .Blood Blood-Peripheral XXXX XXXX   No growth at 24 hours

## 2023-11-20 NOTE — DISCHARGE NOTE PROVIDER - NSDCCPCAREPLAN_GEN_ALL_CORE_FT
PRINCIPAL DISCHARGE DIAGNOSIS  Diagnosis: Acute asthma exacerbation  Assessment and Plan of Treatment: You were admitted with shortness of breath related to an acute episode of asthma exacerbation. You were seen by the pulmonologist, started on steroids, and continued with bronchodilators. Your symptoms have improved. Please take all of your medication as prescribed. Follow with your primary medical doctor on discharge.

## 2023-11-20 NOTE — DISCHARGE NOTE PROVIDER - PROVIDER TOKENS
PROVIDER:[TOKEN:[[id number]],FOLLOWUP:[1 week]],PROVIDER:[TOKEN:[[id number]],FOLLOWUP:[1 week]]

## 2023-11-20 NOTE — PROGRESS NOTE ADULT - SUBJECTIVE AND OBJECTIVE BOX
[facility] [place]       HPI:  This is a 42-year-old female with past medical history of asthma without intubations, breast cancer,  anemia, hernias, leiomyosarcoma presents with cough for 2 weeks.  Patient reports cough with sputum production with associated wheezing.  Patient took Medrol Dosepak, doxycycline, nebs and albuterol inhaler which provided no relief.  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  Dr. Salter referred her to ED for evaluation.  Patient denies fever, chest pain, nausea, vomiting, rash. (19 Nov 2023 07:56)        SUBJECTIVE:      ALLERGIES:  Allergies    gluten (Nausea; Vomiting)  Gluten (Other)  codeine (Hives; Other; Swelling)    Intolerances          MEDICATIONS  (STANDING):  albuterol/ipratropium for Nebulization 3 milliLiter(s) Nebulizer every 4 hours  azithromycin  IVPB      azithromycin  IVPB 500 milliGRAM(s) IV Intermittent every 24 hours  benzonatate 100 milliGRAM(s) Oral three times a day  budesonide 160 MICROgram(s)/formoterol 4.5 MICROgram(s) Inhaler 2 Puff(s) Inhalation two times a day  enoxaparin Injectable 40 milliGRAM(s) SubCutaneous every 24 hours  gabapentin 600 milliGRAM(s) Oral three times a day  guaifenesin/dextromethorphan Oral Liquid 10 milliLiter(s) Oral four times a day  methylPREDNISolone sodium succinate Injectable 40 milliGRAM(s) IV Push two times a day  montelukast 10 milliGRAM(s) Oral daily    MEDICATIONS  (PRN):  acetaminophen     Tablet .. 650 milliGRAM(s) Oral every 6 hours PRN Temp greater or equal to 38C (100.4F), Mild Pain (1 - 3)      REVIEW OF SYSTEMS:  CONSTITUTIONAL: No fever,  RESPIRATORY: No cough, wheezing, shortness of breath  CARDIOVASCULAR: No chest pain, dyspnea, palpitations, dizziness, syncope, paroxysmal nocturnal dyspnea, orthopnea, or arm or leg swelling  GASTROINTESTINAL: No abdominal  or epigastric pain, nausea, vomiting,  diarrhea  NEUROLOGICAL: No headaches,  loss of strength, numbness, or tremors    Vital Signs Last 24 Hrs  T(C): 36.8 (20 Nov 2023 20:37), Max: 37 (20 Nov 2023 12:20)  T(F): 98.2 (20 Nov 2023 20:37), Max: 98.6 (20 Nov 2023 12:20)  HR: 68 (20 Nov 2023 23:28) (60 - 88)  BP: 112/69 (20 Nov 2023 20:37) (112/69 - 144/83)  BP(mean): --  RR: 18 (20 Nov 2023 20:37) (18 - 18)  SpO2: 97% (20 Nov 2023 23:28) (91% - 97%)    Parameters below as of 20 Nov 2023 23:28  Patient On (Oxygen Delivery Method): room air        PHYSICAL EXAM:  HEAD:  Atraumatic, Normocephalic  NECK: Supple and normal thyroid.  No JVD or carotid bruit.   HEART: S1, S2 regular , 1/6 soft ejection systolic murmur in mitral area , no thrill and no gallops .  PULMONARY: Bilateral vesicular breathing , few scattered ronchi and few scattered rales are present .  ABDOMEN: Soft nontender and positive bowl sounds   EXTREMITIES:  No clubbing, cyanosis, or pedal  edema  NEUROLOGICAL: AAOX3 , no focal deficit .    LABS:                        12.2   21.34 )-----------( 371      ( 20 Nov 2023 08:00 )             37.5     11-20    145  |  113<H>  |  16  ----------------------------<  136<H>  3.8   |  26  |  0.42<L>    Ca    8.4<L>      20 Nov 2023 08:00  Mg     2.4     11-20    TPro  6.4  /  Alb  3.2<L>  /  TBili  0.2  /  DBili  x   /  AST  6<L>  /  ALT  20  /  AlkPhos  103  11-19        PT/INR - ( 19 Nov 2023 05:53 )   PT: 10.6 sec;   INR: 0.90 ratio           Urinalysis Basic - ( 20 Nov 2023 08:00 )    Color: x / Appearance: x / SG: x / pH: x  Gluc: 136 mg/dL / Ketone: x  / Bili: x / Urobili: x   Blood: x / Protein: x / Nitrite: x   Leuk Esterase: x / RBC: x / WBC x   Sq Epi: x / Non Sq Epi: x / Bacteria: x      BNP      EKG:  ECHO:  IMAGING:    Assessment/Plan    Will continue to follow during hospital course and give further recommendations as needed . Thanks for your referral . if any questions please contact me at office ([phone] [phone])  JESSA TORREZ MD [place] [id number]  [address]  OFFICE : [phone]  CELL : [phone]  CARDIOLOGY F/U  :        HPI:  This is a 42-year-old female with past medical history of asthma without intubations, breast cancer,  anemia, hernias, leiomyosarcoma presents with cough for 2 weeks.  Patient reports cough with sputum production with associated wheezing.  Patient took Medrol Dosepak, doxycycline, nebs and albuterol inhaler which provided no relief.  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  Dr. Salter referred her to ED for evaluation.  Patient denies fever, chest pain, nausea, vomiting, rash. (19 Nov 2023 07:56)        SUBJECTIVE: Patient feeling better .      ALLERGIES:  Allergies    gluten (Nausea; Vomiting)  Gluten (Other)  codeine (Hives; Other; Swelling)    Intolerances          MEDICATIONS  (STANDING):  albuterol/ipratropium for Nebulization 3 milliLiter(s) Nebulizer every 4 hours  azithromycin  IVPB      azithromycin  IVPB 500 milliGRAM(s) IV Intermittent every 24 hours  benzonatate 100 milliGRAM(s) Oral three times a day  budesonide 160 MICROgram(s)/formoterol 4.5 MICROgram(s) Inhaler 2 Puff(s) Inhalation two times a day  enoxaparin Injectable 40 milliGRAM(s) SubCutaneous every 24 hours  gabapentin 600 milliGRAM(s) Oral three times a day  guaifenesin/dextromethorphan Oral Liquid 10 milliLiter(s) Oral four times a day  methylPREDNISolone sodium succinate Injectable 40 milliGRAM(s) IV Push two times a day  montelukast 10 milliGRAM(s) Oral daily    MEDICATIONS  (PRN):  acetaminophen     Tablet .. 650 milliGRAM(s) Oral every 6 hours PRN Temp greater or equal to 38C (100.4F), Mild Pain (1 - 3)      REVIEW OF SYSTEMS:  CONSTITUTIONAL: No fever,  RESPIRATORY: Improvement in  cough, wheezing, shortness of breath  CARDIOVASCULAR: No chest pain,  palpitations, dizziness, syncope, paroxysmal nocturnal dyspnea,  or arm or leg swelling and improvement in SOB .   GASTROINTESTINAL: No abdominal  or epigastric pain, nausea, vomiting,  diarrhea  NEUROLOGICAL: No headaches,  numbness, or tremors  Skin : No itching .  Hematology : No active bleeding .  Endocrinology : No heat and cold intolerance .  Psychiatry : Patient is calm   Genitourinary : No dysuria .  Musculoskeletal : Patient has mild arthritis .    Vital Signs Last 24 Hrs  T(C): 36.8 (20 Nov 2023 20:37), Max: 37 (20 Nov 2023 12:20)  T(F): 98.2 (20 Nov 2023 20:37), Max: 98.6 (20 Nov 2023 12:20)  HR: 68 (20 Nov 2023 23:28) (60 - 88)  BP: 112/69 (20 Nov 2023 20:37) (112/69 - 144/83)  BP(mean): --  RR: 18 (20 Nov 2023 20:37) (18 - 18)  SpO2: 97% (20 Nov 2023 23:28) (91% - 97%)    Parameters below as of 20 Nov 2023 23:28  Patient On (Oxygen Delivery Method): room air        PHYSICAL EXAM:  HEAD:  Atraumatic, Normocephalic  NECK: Supple and normal thyroid.  No JVD or carotid bruit.   HEART: S1, S2 regular , 1/6 soft ejection systolic murmur in mitral area , no thrill and no gallops .  PULMONARY: Bilateral vesicular breathing , few scattered rhonchi and few scattered rales are present .  ABDOMEN: Soft nontender and positive bowl sounds   EXTREMITIES:  No clubbing, cyanosis, or pedal  edema  NEUROLOGICAL: AAOX3 , no focal deficit .  Skin : No rashes .  Musculoskeletal  : No joint swellings .    LABS:                        12.2   21.34 )-----------( 371      ( 20 Nov 2023 08:00 )             37.5     11-20    145  |  113<H>  |  16  ----------------------------<  136<H>  3.8   |  26  |  0.42<L>    Ca    8.4<L>      20 Nov 2023 08:00  Mg     2.4     11-20    TPro  6.4  /  Alb  3.2<L>  /  TBili  0.2  /  DBili  x   /  AST  6<L>  /  ALT  20  /  AlkPhos  103  11-19        PT/INR - ( 19 Nov 2023 05:53 )   PT: 10.6 sec;   INR: 0.90 ratio           Urinalysis Basic - ( 20 Nov 2023 08:00 )    Color: x / Appearance: x / SG: x / pH: x  Gluc: 136 mg/dL / Ketone: x  / Bili: x / Urobili: x   Blood: x / Protein: x / Nitrite: x   Leuk Esterase: x / RBC: x / WBC x   Sq Epi: x / Non Sq Epi: x / Bacteria: x      Assessment/Plan  Patient has :  1) Exacerbation of bronchial asthma and better .  2) Atypical chest pain and  Troponin I negative .  3) EKG abnormalities and no cardiac arrhythmias  Plan : 1) Monitor hemoglobin and electrolytes 2) Bronchodilators and steroids  as per pulmonary . 3) Rest of medications as such 4) Increase ambulation . 5) Prognosis guarded .  Will continue to follow during hospital course and give further recommendations as needed . Thanks for your referral . if any questions please contact me at office ([phone] cell [phone])

## 2023-11-20 NOTE — DISCHARGE NOTE PROVIDER - CARE PROVIDER_API CALL
Aditi Dickens  Internal Medicine  [address], [address]  [place] [address]  Phone: [phone]  Fax: [phone]  Follow Up Time: 1 week    Dustin Salter  Pulmonary Disease  [address] [place] [id number]  Phone: [phone]  Fax: [phone]  Follow Up Time: 1 week

## 2023-11-20 NOTE — PROGRESS NOTE ADULT - ASSESSMENT
REASON FOR VISIT  .. Management of problems listed below        REVIEW OF SYMPTOMS   Able to give ROS  Yes     RELIABILITY +/-   CONSTITUTIONAL Weakness Yes    ENDOCRINE  No heat or cold intolerance    ALLERGY No hives  Sore throat No stridor  RESP Shortness of breath YES   NEURO New weakness No   CARDIAC   Palpitations No         PHYSICAL EXAM    HEENT Unremarkable  atraumatic   RESP Fair air entry  Harsh breath sound   CARDIAC S1 S2 No S3     NO JVD    ABDOMEN No hepatosplenomegaly   PEDAL EDEMA present No calf tenderness  NO rash       GENERAL DATA .     GOC.  .. 11/18/2023 full code   ICU STAY.  .. none  COVID. .. scv2 11/18/2023 scv2 (-)    BEST PRACTICE ISSUES.    HOB ELEVATN.  .. Yes    DVT PPLX.  ..11/18/2023 lvnx 40  STRESS ULCER PPLX.  ..      INFECTION PPLX.  ..           SPEECH SWALLOW RECOMMENDATIONS.   ..        DIET.   ..  11/18/2023 regl    IV fl. .. 11/18/2023 1/2 ns 65  BOLUS. ..     ALLGY. ..  codeine gluten                        WT. ..  11/18/2023 113  BMI. ..        PROCEDURES. ..     ABGS.   . 11/18/2023 11:29 PM ra 753/27/182     VS/ PO/IO/ VENT/ DRIPS.   11/20/2023 afeb 72 140/80   11/20/2023 ra 92%   [id number]  SUMMARY.  . 42-year-old female with past medical history of asthma without intubations, breast cancer,  anemia, hernias, leiomyosarcoma presents with cough for 2 weeks.  Patient reports cough with sputum production with associated wheezing.  Patient took Medrol Dosepak, doxycycline, nebs and albuterol inhaler which provided no relief.  Patient works at nursing facility   . Recent uv rxed with doxy and medrol started 2 w ago    . 11/18/2023 is on her second medrol pack     . In [place] age 4 [place]  . Quit smoking 20 y 1/2 ppd   . Has cat and dog  . ALLERGY Codeine gluten     . PMH  .. Abdominal hernia   .. Anemia   .. Breast cancer Right lumpectomy 6/2014, S/P radiation, last dose, 08/2014  .. Celiac disease   .. Leiomyosarcoma of lower extremity, left left ankle  .. Obesity   .. Polio 5 y/o.  .. has had covid x 4 times  last 2022     . HOME MEDS   .. Percocet 5/325 stopped  .. Fentanyl 100 stopped  .. dilaudid  stopped   .. gabapentin 600.3  .. proair     MAIN ISSUES/PLANS.  . RESP FAILURE.  .. 11/18/2023  abg showed resp alkalosis which is as expected in asthma     . RESP TRACT INFECTN  .. W 11/18-11/19-11/20/2023 w 20 - 16 - 21   .. pr 11/19/2023 pr (-)    .. Flu ab 11/18/2023 (-)  .. mrsa 11/19 (-)   .. uc 11/18 (-)   .. rsv 11/18/2023 (-)   .. 11/18 azithro   .. completed 5d azithro       . ASTHMA EX.  .. 11/18/2023 Duoneb  .. 11/18/2023 symbicort  .. 11/18/2023 solumed   .. 11/18/2023 azithro   .. 11/18/2023 IV mag sulfate 2g   .. 11/19/2023 Is feeling better but still wheezing  .. 11/20/2023 improved  will decrease solumed 40.2  .. cont rx   .. Pt advised pulm followup after discharge     . RO VTE.   .. 11/18/2023 venous duplx (-)     . DISPO   .. Possible dc 11/21     TIME SPENT.  . Over 36 minutes aggregate care time spent on encounter; activities included   direct patient care, counseling and/or coordinating care reviewing notes, lab data/ imaging , discussion with multidisciplinary team/ patient  /family and explaining in detail risks, benefits, alternatives  of the recommendations     MARLA ARRINGTON

## 2023-11-20 NOTE — DISCHARGE NOTE PROVIDER - HOSPITAL COURSE
HPI:  This is a 42-year-old female with past medical history of asthma without intubations, breast cancer,  anemia, hernias, leiomyosarcoma presents with cough for 2 weeks.  Patient reports cough with sputum production with associated wheezing.  Patient took Medrol Dosepak, doxycycline, nebs and albuterol inhaler which provided no relief.  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  Dr. Salter referred her to ED for evaluation.  Patient denies fever, chest pain, nausea, vomiting, rash. (19 Nov 2023 07:56)      ---  HOSPITAL COURSE: Patient admitted to medicine floor for management of asthma. Patient was seen by pulmonology, started on IV antibiotics, steroids, oxygen therapy as needed.     Pt seen and examined on day of discharge. Patient is medically optimized for discharge to home with close outpatient followup.    PHYSICAL EXAM ON DAY OF DISCHARGE:        ---  CONSULTANTS:     ---  TIME SPENT:  I, the attending physician, was physically present for the key portions of the evaluation and management (E/M) service provided. The total amount of time spent reviewing the hospital notes, laboratory values, imaging findings, assessing/counseling the patient, discussing with consultant physicians, social work, nursing staff was -- minutes    ---  Primary care provider was made aware of plan for discharge:      [  ] NO     [  ] YES   HPI:  This is a 42-year-old female with past medical history of asthma without intubations, breast cancer,  anemia, hernias, leiomyosarcoma presents with cough for 2 weeks.  Patient reports cough with sputum production with associated wheezing.  Patient took Medrol Dosepak, doxycycline, nebs and albuterol inhaler which provided no relief.  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  Dr. Salter referred her to ED for evaluation.  Patient denies fever, chest pain, nausea, vomiting, rash. (19 Nov 2023 07:56)      ---  HOSPITAL COURSE: Patient admitted to medicine floor for management of asthma. Patient was seen by pulmonology, started on IV antibiotics, steroids, oxygen therapy as needed.     Pt seen and examined on day of discharge. Patient is medically optimized for discharge to home with close outpatient followup.      ---  TIME SPENT:  I, the attending physician, was physically present for the key portions of the evaluation and management (E/M) service provided. The total amount of time spent reviewing the hospital notes, laboratory values, imaging findings, assessing/counseling the patient, discussing with consultant physicians, social work, nursing staff was -- minutes    >35 minutes spent on discharge

## 2023-11-21 ENCOUNTER — TRANSCRIPTION ENCOUNTER (OUTPATIENT)
Age: 43
End: 2023-11-21

## 2023-11-21 VITALS
OXYGEN SATURATION: 93 % | HEART RATE: 62 BPM | SYSTOLIC BLOOD PRESSURE: 117 MMHG | DIASTOLIC BLOOD PRESSURE: 60 MMHG | RESPIRATION RATE: 18 BRPM | TEMPERATURE: 98 F

## 2023-11-21 LAB
ANION GAP SERPL CALC-SCNC: 6 MMOL/L — SIGNIFICANT CHANGE UP (ref 5–17)
ANION GAP SERPL CALC-SCNC: 6 MMOL/L — SIGNIFICANT CHANGE UP (ref 5–17)
BUN SERPL-MCNC: 20 MG/DL — SIGNIFICANT CHANGE UP (ref 7–23)
BUN SERPL-MCNC: 20 MG/DL — SIGNIFICANT CHANGE UP (ref 7–23)
CALCIUM SERPL-MCNC: 8.2 MG/DL — LOW (ref 8.5–10.1)
CALCIUM SERPL-MCNC: 8.2 MG/DL — LOW (ref 8.5–10.1)
CHLORIDE SERPL-SCNC: 111 MMOL/L — HIGH (ref 96–108)
CHLORIDE SERPL-SCNC: 111 MMOL/L — HIGH (ref 96–108)
CO2 SERPL-SCNC: 26 MMOL/L — SIGNIFICANT CHANGE UP (ref 22–31)
CO2 SERPL-SCNC: 26 MMOL/L — SIGNIFICANT CHANGE UP (ref 22–31)
CREAT SERPL-MCNC: 0.4 MG/DL — LOW (ref 0.5–1.3)
CREAT SERPL-MCNC: 0.4 MG/DL — LOW (ref 0.5–1.3)
EGFR: 125 ML/MIN/1.73M2 — SIGNIFICANT CHANGE UP
EGFR: 125 ML/MIN/1.73M2 — SIGNIFICANT CHANGE UP
GLUCOSE SERPL-MCNC: 106 MG/DL — HIGH (ref 70–99)
GLUCOSE SERPL-MCNC: 106 MG/DL — HIGH (ref 70–99)
HCT VFR BLD CALC: 37.3 % — SIGNIFICANT CHANGE UP (ref 34.5–45)
HCT VFR BLD CALC: 37.3 % — SIGNIFICANT CHANGE UP (ref 34.5–45)
HGB BLD-MCNC: 11.9 G/DL — SIGNIFICANT CHANGE UP (ref 11.5–15.5)
HGB BLD-MCNC: 11.9 G/DL — SIGNIFICANT CHANGE UP (ref 11.5–15.5)
MAGNESIUM SERPL-MCNC: 2.3 MG/DL — SIGNIFICANT CHANGE UP (ref 1.6–2.6)
MAGNESIUM SERPL-MCNC: 2.3 MG/DL — SIGNIFICANT CHANGE UP (ref 1.6–2.6)
MCHC RBC-ENTMCNC: 27 PG — SIGNIFICANT CHANGE UP (ref 27–34)
MCHC RBC-ENTMCNC: 27 PG — SIGNIFICANT CHANGE UP (ref 27–34)
MCHC RBC-ENTMCNC: 31.9 GM/DL — LOW (ref 32–36)
MCHC RBC-ENTMCNC: 31.9 GM/DL — LOW (ref 32–36)
MCV RBC AUTO: 84.8 FL — SIGNIFICANT CHANGE UP (ref 80–100)
MCV RBC AUTO: 84.8 FL — SIGNIFICANT CHANGE UP (ref 80–100)
NRBC # BLD: 0 /100 WBCS — SIGNIFICANT CHANGE UP (ref 0–0)
NRBC # BLD: 0 /100 WBCS — SIGNIFICANT CHANGE UP (ref 0–0)
PLATELET # BLD AUTO: 365 K/UL — SIGNIFICANT CHANGE UP (ref 150–400)
PLATELET # BLD AUTO: 365 K/UL — SIGNIFICANT CHANGE UP (ref 150–400)
POTASSIUM SERPL-MCNC: 3.4 MMOL/L — LOW (ref 3.5–5.3)
POTASSIUM SERPL-MCNC: 3.4 MMOL/L — LOW (ref 3.5–5.3)
POTASSIUM SERPL-SCNC: 3.4 MMOL/L — LOW (ref 3.5–5.3)
POTASSIUM SERPL-SCNC: 3.4 MMOL/L — LOW (ref 3.5–5.3)
RBC # BLD: 4.4 M/UL — SIGNIFICANT CHANGE UP (ref 3.8–5.2)
RBC # BLD: 4.4 M/UL — SIGNIFICANT CHANGE UP (ref 3.8–5.2)
RBC # FLD: 14.5 % — SIGNIFICANT CHANGE UP (ref 10.3–14.5)
RBC # FLD: 14.5 % — SIGNIFICANT CHANGE UP (ref 10.3–14.5)
SODIUM SERPL-SCNC: 143 MMOL/L — SIGNIFICANT CHANGE UP (ref 135–145)
SODIUM SERPL-SCNC: 143 MMOL/L — SIGNIFICANT CHANGE UP (ref 135–145)
T4 FREE SERPL-MCNC: 1.2 NG/DL — SIGNIFICANT CHANGE UP (ref 0.9–1.8)
T4 FREE SERPL-MCNC: 1.2 NG/DL — SIGNIFICANT CHANGE UP (ref 0.9–1.8)
TSH SERPL-MCNC: 0.3 UIU/ML — LOW (ref 0.36–3.74)
TSH SERPL-MCNC: 0.3 UIU/ML — LOW (ref 0.36–3.74)
WBC # BLD: 20.06 K/UL — HIGH (ref 3.8–10.5)
WBC # BLD: 20.06 K/UL — HIGH (ref 3.8–10.5)
WBC # FLD AUTO: 20.06 K/UL — HIGH (ref 3.8–10.5)
WBC # FLD AUTO: 20.06 K/UL — HIGH (ref 3.8–10.5)

## 2023-11-21 PROCEDURE — 84702 CHORIONIC GONADOTROPIN TEST: CPT

## 2023-11-21 PROCEDURE — 93306 TTE W/DOPPLER COMPLETE: CPT

## 2023-11-21 PROCEDURE — 83735 ASSAY OF MAGNESIUM: CPT

## 2023-11-21 PROCEDURE — 83605 ASSAY OF LACTIC ACID: CPT

## 2023-11-21 PROCEDURE — 94760 N-INVAS EAR/PLS OXIMETRY 1: CPT

## 2023-11-21 PROCEDURE — 94640 AIRWAY INHALATION TREATMENT: CPT

## 2023-11-21 PROCEDURE — 96374 THER/PROPH/DIAG INJ IV PUSH: CPT

## 2023-11-21 PROCEDURE — 84443 ASSAY THYROID STIM HORMONE: CPT

## 2023-11-21 PROCEDURE — 84145 PROCALCITONIN (PCT): CPT

## 2023-11-21 PROCEDURE — 83036 HEMOGLOBIN GLYCOSYLATED A1C: CPT

## 2023-11-21 PROCEDURE — 36415 COLL VENOUS BLD VENIPUNCTURE: CPT

## 2023-11-21 PROCEDURE — 99285 EMERGENCY DEPT VISIT HI MDM: CPT | Mod: 25

## 2023-11-21 PROCEDURE — 85730 THROMBOPLASTIN TIME PARTIAL: CPT

## 2023-11-21 PROCEDURE — 84439 ASSAY OF FREE THYROXINE: CPT

## 2023-11-21 PROCEDURE — 85610 PROTHROMBIN TIME: CPT

## 2023-11-21 PROCEDURE — 96361 HYDRATE IV INFUSION ADD-ON: CPT

## 2023-11-21 PROCEDURE — 80053 COMPREHEN METABOLIC PANEL: CPT

## 2023-11-21 PROCEDURE — 93005 ELECTROCARDIOGRAM TRACING: CPT

## 2023-11-21 PROCEDURE — 80048 BASIC METABOLIC PNL TOTAL CA: CPT

## 2023-11-21 PROCEDURE — 71275 CT ANGIOGRAPHY CHEST: CPT

## 2023-11-21 PROCEDURE — 93970 EXTREMITY STUDY: CPT

## 2023-11-21 PROCEDURE — 85025 COMPLETE CBC W/AUTO DIFF WBC: CPT

## 2023-11-21 PROCEDURE — 71046 X-RAY EXAM CHEST 2 VIEWS: CPT

## 2023-11-21 PROCEDURE — 87637 SARSCOV2&INF A&B&RSV AMP PRB: CPT

## 2023-11-21 PROCEDURE — 87040 BLOOD CULTURE FOR BACTERIA: CPT

## 2023-11-21 PROCEDURE — 82803 BLOOD GASES ANY COMBINATION: CPT

## 2023-11-21 PROCEDURE — 80061 LIPID PANEL: CPT

## 2023-11-21 PROCEDURE — 84484 ASSAY OF TROPONIN QUANT: CPT

## 2023-11-21 PROCEDURE — 87641 MR-STAPH DNA AMP PROBE: CPT

## 2023-11-21 PROCEDURE — 81001 URINALYSIS AUTO W/SCOPE: CPT

## 2023-11-21 PROCEDURE — 85379 FIBRIN DEGRADATION QUANT: CPT

## 2023-11-21 PROCEDURE — 87640 STAPH A DNA AMP PROBE: CPT

## 2023-11-21 PROCEDURE — 85027 COMPLETE CBC AUTOMATED: CPT

## 2023-11-21 PROCEDURE — 87070 CULTURE OTHR SPECIMN AEROBIC: CPT

## 2023-11-21 PROCEDURE — 83880 ASSAY OF NATRIURETIC PEPTIDE: CPT

## 2023-11-21 RX ORDER — POTASSIUM CHLORIDE 20 MEQ
40 PACKET (EA) ORAL ONCE
Refills: 0 | Status: COMPLETED | OUTPATIENT
Start: 2023-11-21 | End: 2023-11-21

## 2023-11-21 RX ORDER — MONTELUKAST 4 MG/1
1 TABLET, CHEWABLE ORAL
Qty: 30 | Refills: 0
Start: 2023-11-21 | End: 2023-12-20

## 2023-11-21 RX ORDER — BUDESONIDE AND FORMOTEROL FUMARATE DIHYDRATE 160; 4.5 UG/1; UG/1
2 AEROSOL RESPIRATORY (INHALATION)
Qty: 1 | Refills: 0
Start: 2023-11-21

## 2023-11-21 RX ADMIN — Medication 100 MILLIGRAM(S): at 06:35

## 2023-11-21 RX ADMIN — Medication 40 MILLIGRAM(S): at 06:35

## 2023-11-21 RX ADMIN — Medication 3 MILLILITER(S): at 04:32

## 2023-11-21 RX ADMIN — GABAPENTIN 600 MILLIGRAM(S): 400 CAPSULE ORAL at 13:37

## 2023-11-21 RX ADMIN — Medication 10 MILLILITER(S): at 06:42

## 2023-11-21 RX ADMIN — Medication 3 MILLILITER(S): at 10:41

## 2023-11-21 RX ADMIN — BUDESONIDE AND FORMOTEROL FUMARATE DIHYDRATE 2 PUFF(S): 160; 4.5 AEROSOL RESPIRATORY (INHALATION) at 06:36

## 2023-11-21 RX ADMIN — GABAPENTIN 600 MILLIGRAM(S): 400 CAPSULE ORAL at 06:35

## 2023-11-21 RX ADMIN — Medication 100 MILLIGRAM(S): at 13:37

## 2023-11-21 RX ADMIN — Medication 40 MILLIEQUIVALENT(S): at 11:28

## 2023-11-21 RX ADMIN — Medication 3 MILLILITER(S): at 07:40

## 2023-11-21 RX ADMIN — MONTELUKAST 10 MILLIGRAM(S): 4 TABLET, CHEWABLE ORAL at 11:29

## 2023-11-21 RX ADMIN — Medication 10 MILLILITER(S): at 11:28

## 2023-11-21 NOTE — PROGRESS NOTE ADULT - ASSESSMENT
REASON FOR VISIT  .. Management of problems listed below        REVIEW OF SYMPTOMS   Able to give ROS  Yes     RELIABILITY +/-   CONSTITUTIONAL Weakness Yes    ENDOCRINE  No heat or cold intolerance    ALLERGY No hives  Sore throat No stridor  RESP Shortness of breath YES   NEURO New weakness No   CARDIAC   Palpitations No         PHYSICAL EXAM    HEENT Unremarkable  atraumatic   RESP Fair air entry  Harsh breath sound   CARDIAC S1 S2 No S3     NO JVD    ABDOMEN No hepatosplenomegaly   PEDAL EDEMA present No calf tenderness  NO rash       GENERAL DATA .     GOC.  .. 11/18/2023 full code   ICU STAY.  .. none  COVID. .. scv2 11/18/2023 scv2 (-)    BEST PRACTICE ISSUES.    HOB ELEVATN.  .. Yes    DVT PPLX.  ..11/18/2023 lvnx 40  STRESS ULCER PPLX.  ..      INFECTION PPLX.  ..           SPEECH SWALLOW RECOMMENDATIONS.   ..        DIET.   ..  11/18/2023 regl    IV fl. .. 11/18/2023 1/2 ns 65  BOLUS. ..     ALLGY. ..  codeine gluten                        WT. ..  11/18/2023 113  BMI. ..        ABGS.   . 11/18/2023 11:29 PM ra 753/27/182     VS/ PO/IO/ VENT/ DRIPS.   11/21/2023 ra 93%   11/21/2023 110/60 18  Ha2014  SUMMARY.  . 42-year-old female with past medical history of asthma without intubations, breast cancer,  anemia, hernias, leiomyosarcoma presents with cough for 2 weeks.  Patient reports cough with sputum production with associated wheezing.  Patient took Medrol Dosepak, doxycycline, nebs and albuterol inhaler which provided no relief.  Patient works at nursing facility   . Recent uv rxed with doxy and medrol started 2 w ago    . 11/18/2023 is on her second medrol pack     . [place] 4 [place]  . Quit smoking 20 y 1/2 ppd   . Has cat and dog  . ALLERGY Codeine gluten     . PMH  .. Abdominal hernia   .. Anemia   .. Breast cancer Right lumpectomy 6/2014, S/P radiation, last dose, 08/2014  .. Celiac disease   .. Leiomyosarcoma of lower extremity, left left ankle  .. Obesity   .. Polio 3 y/o.  .. has had covid x 4 times  last 2022     . HOME MEDS   .. Percocet 5/325 stopped  .. Fentanyl 100 stopped  .. dilaudid  stopped   .. gabapentin 600.3  .. proair     MAIN ISSUES/PLANS.  . RESP FAILURE.  .. 11/18/2023  abg showed resp alkalosis which is as expected in asthma     . RESP TRACT INFECTN  .. W 11/18-11/19-11/20/2023 w 20 - 16 - 21   .. pr 11/19/2023 pr (-)    .. Flu ab 11/18/2023 (-)  .. mrsa 11/19 (-)   .. uc 11/18 (-)   .. rsv 11/18/2023 (-)   .. 11/18 azithro   .. completed 5d azithro       . ASTHMA EX.  .. 11/18/2023 Duoneb  .. 11/18/2023 symbicort  .. 11/18/2023 solumed   .. 11/18/2023 azithro   .. 11/18/2023 IV mag sulfate 2g   .. 11/19/2023 Is feeling better but still wheezing  .. 11/20/2023 improved  will decrease solumed 40.2  .. cont rx   .. Pt advised pulm followup after discharge     . RO VTE.   .. 11/18/2023 venous duplx (-)     . DISPO   .. Possible dc 11/21     TIME SPENT.  . Over 36 minutes aggregate care time spent on encounter; activities included   direct patient care, counseling and/or coordinating care reviewing notes, lab data/ imaging , discussion with multidisciplinary team/ patient  /family and explaining in detail risks, benefits, alternatives  of the recommendations     MARLA ARRINGTON

## 2023-11-21 NOTE — DISCHARGE NOTE NURSING/CASE MANAGEMENT/SOCIAL WORK - PATIENT PORTAL LINK FT
You can access the FollowMyHealth Patient Portal offered by [facility] by registering at the following website: [email or web address]. By joining [employer]’s FollowMyHealth portal, you will also be able to view your health information using other applications (apps) compatible with our system.

## 2023-11-21 NOTE — PROGRESS NOTE ADULT - SUBJECTIVE AND OBJECTIVE BOX
Date of Service: 11-21-23 @ 13:20    Patient is a 42y old  Female who presents with a chief complaint of SOB (21 Nov 2023 06:21)      INTERVAL HPI/OVERNIGHT EVENTS: Patient seen and examined. NAD. No complaints. Feels well. Wants to go home.    Vital Signs Last 24 Hrs  T(C): 36.6 (21 Nov 2023 11:40), Max: 36.8 (20 Nov 2023 20:37)  T(F): 97.8 (21 Nov 2023 11:40), Max: 98.2 (20 Nov 2023 20:37)  HR: 62 (21 Nov 2023 11:40) (54 - 86)  BP: 117/60 (21 Nov 2023 11:40) (104/63 - 117/60)  BP(mean): --  RR: 18 (21 Nov 2023 11:40) (18 - 18)  SpO2: 93% (21 Nov 2023 11:40) (91% - 98%)    Parameters below as of 21 Nov 2023 11:40  Patient On (Oxygen Delivery Method): room air        11-21    143  |  111<H>  |  20  ----------------------------<  106<H>  3.4<L>   |  26  |  0.40<L>    Ca    8.2<L>      21 Nov 2023 05:08  Mg     2.3     11-21                            11.9   20.06 )-----------( 365      ( 21 Nov 2023 05:08 )             37.3       CAPILLARY BLOOD GLUCOSE        Urinalysis Basic - ( 21 Nov 2023 05:08 )    Color: x / Appearance: x / SG: x / pH: x  Gluc: 106 mg/dL / Ketone: x  / Bili: x / Urobili: x   Blood: x / Protein: x / Nitrite: x   Leuk Esterase: x / RBC: x / WBC x   Sq Epi: x / Non Sq Epi: x / Bacteria: x              acetaminophen     Tablet .. 650 milliGRAM(s) Oral every 6 hours PRN  albuterol/ipratropium for Nebulization 3 milliLiter(s) Nebulizer every 4 hours  azithromycin  IVPB      azithromycin  IVPB 500 milliGRAM(s) IV Intermittent every 24 hours  benzonatate 100 milliGRAM(s) Oral three times a day  budesonide 160 MICROgram(s)/formoterol 4.5 MICROgram(s) Inhaler 2 Puff(s) Inhalation two times a day  enoxaparin Injectable 40 milliGRAM(s) SubCutaneous every 24 hours  gabapentin 600 milliGRAM(s) Oral three times a day  guaifenesin/dextromethorphan Oral Liquid 10 milliLiter(s) Oral four times a day  methylPREDNISolone sodium succinate Injectable 40 milliGRAM(s) IV Push two times a day  montelukast 10 milliGRAM(s) Oral daily              REVIEW OF SYSTEMS:  CONSTITUTIONAL: No fever, no weight loss, or no fatigue  NECK: No pain, no stiffness  RESPIRATORY: No cough, no wheezing, no chills, no hemoptysis, No shortness of breath  CARDIOVASCULAR: No chest pain, no palpitations, no dizziness, no leg swelling  GASTROINTESTINAL: No abdominal pain. No nausea, no vomiting, no hematemesis; No diarrhea, no constipation. No melena, no hematochezia.  GENITOURINARY: No dysuria, no frequency, no hematuria, no incontinence  NEUROLOGICAL: No headaches, no loss of strength, no numbness, no tremors  SKIN: No itching, no burning  MUSCULOSKELETAL: No joint pain, no swelling; No muscle, no back, no extremity pain  PSYCHIATRIC: No depression, no mood swings,   HEME/LYMPH: No easy bruising, no bleeding gums  ALLERY AND IMMUNOLOGIC: No hives       Consultant(s) Notes Reviewed:  [X] YES  [ ] NO    PHYSICAL EXAM:  GENERAL: NAD  HEAD:  Atraumatic, Normocephalic  EYES: EOMI, PERRLA, conjunctiva and sclera clear  ENMT: No tonsillar erythema, exudates, or enlargement; Moist mucous membranes  NECK: Supple, No JVD  NERVOUS SYSTEM:  Awake & alert  CHEST/LUNG: Clear to auscultation bilaterally; No rales, rhonchi, wheezing,  HEART: Regular rate and rhythm  ABDOMEN: Soft, Nontender, Nondistended; Bowel sounds present  EXTREMITIES:  No clubbing, cyanosis, or edema  LYMPH: No lymphadenopathy noted  SKIN: No rashes      Advanced care planning discussed with patient/family [X] YES   [ ] NO    Advanced care planning discussed with patient/family. Patient's health status was discussed. All appropriate changes have been made regarding patient's end-of-life care. Advanced care planning forms reviewed/discussed/completed.  20 minutes spent.

## 2023-11-21 NOTE — PROGRESS NOTE ADULT - SUBJECTIVE AND OBJECTIVE BOX
CHIEF COMPLAINT/ REASON FOR VISIT  .. Patient was seen to address the  issue listed under PROBLEM LIST which is located toward bottom of this note     NURY GUTIÉRREZ    SHIREEN [address]WES [address]    Allergies    gluten (Nausea; Vomiting)  Gluten (Other)  codeine (Hives; Other; Swelling)    Intolerances        PAST MEDICAL & SURGICAL HISTORY:  Inflammatory spondylopathy      History of celiac disease      Polio  5y/o      Breast cancer  Right lumpectomy 6/2014, S/P radiation, last dose, 08/2014      Anemia      Obesity      Breast cancer  right      Abdominal hernia      Celiac disease      Leiomyosarcoma of lower extremity, left  left ankle      Disorder of tendon  Right leg tendon transfer, 1987      Other disorders of bone development and growth  Left leg surgery to slow growth of leg to equalize the leg length of right leg, 1988      History of tonsillectomy  bilateral M&T, 1990      Heel cord tightness  repair of muscle cut in 1988      Status post breast lumpectomy  right      History of hernia surgery          FAMILY HISTORY:      Home Medications:  Neurontin 600 mg oral tablet: 1 tab(s) orally 3 times a day (19 Nov 2023 10:18)      MEDICATIONS  (STANDING):  albuterol/ipratropium for Nebulization 3 milliLiter(s) Nebulizer every 4 hours  azithromycin  IVPB 500 milliGRAM(s) IV Intermittent every 24 hours  azithromycin  IVPB      benzonatate 100 milliGRAM(s) Oral three times a day  budesonide 160 MICROgram(s)/formoterol 4.5 MICROgram(s) Inhaler 2 Puff(s) Inhalation two times a day  enoxaparin Injectable 40 milliGRAM(s) SubCutaneous every 24 hours  gabapentin 600 milliGRAM(s) Oral three times a day  guaifenesin/dextromethorphan Oral Liquid 10 milliLiter(s) Oral four times a day  methylPREDNISolone sodium succinate Injectable 40 milliGRAM(s) IV Push two times a day  montelukast 10 milliGRAM(s) Oral daily    MEDICATIONS  (PRN):  acetaminophen     Tablet .. 650 milliGRAM(s) Oral every 6 hours PRN Temp greater or equal to 38C (100.4F), Mild Pain (1 - 3)      Diet, Regular:   Gluten-Gliadin Restricted (11-19-23 @ 10:17) [Active]          Vital Signs Last 24 Hrs  T(C): 36.5 (21 Nov 2023 05:23), Max: 37 (20 Nov 2023 12:20)  T(F): 97.7 (21 Nov 2023 05:23), Max: 98.6 (20 Nov 2023 12:20)  HR: 61 (21 Nov 2023 05:23) (60 - 88)  BP: 104/63 (21 Nov 2023 05:23) (104/63 - 112/69)  BP(mean): --  RR: 18 (21 Nov 2023 05:23) (18 - 18)  SpO2: 94% (21 Nov 2023 05:23) (91% - 97%)    Parameters below as of 21 Nov 2023 05:23  Patient On (Oxygen Delivery Method): room air          11-19-23 @ 07:01  -  11-20-23 @ 07:00  --------------------------------------------------------  IN: 250 mL / OUT: 0 mL / NET: 250 mL              LABS:                        12.2   21.34 )-----------( 371      ( 20 Nov 2023 08:00 )             37.5     11-20    145  |  113<H>  |  16  ----------------------------<  136<H>  3.8   |  26  |  0.42<L>    Ca    8.4<L>      20 Nov 2023 08:00  Mg     2.4     11-20        Urinalysis Basic - ( 20 Nov 2023 08:00 )    Color: x / Appearance: x / SG: x / pH: x  Gluc: 136 mg/dL / Ketone: x  / Bili: x / Urobili: x   Blood: x / Protein: x / Nitrite: x   Leuk Esterase: x / RBC: x / WBC x   Sq Epi: x / Non Sq Epi: x / Bacteria: x            WBC:  WBC Count: 21.34 K/uL (11-20 @ 08:00)  WBC Count: 16.89 K/uL (11-19 @ 05:53)  WBC Count: 20.27 K/uL (11-18 @ 20:40)      MICROBIOLOGY:  RECENT CULTURES:  11-20 .Sputum Sputum XXXX   Numerous polymorphonuclear leukocytes per low power field  Rare Squamous epithelial cells per low power field  Numerous Gram Positive Cocci in Pairs and Chains per oil power field  Rare Gram Positive Cocci in Clusters per oil power field  Rare GramNegative Rods per oil power field XXXX    11-18 .Blood Blood-Peripheral XXXX XXXX   No growth at 48 Hours                    Sodium:  Sodium: 145 mmol/L (11-20 @ 08:00)  Sodium: 143 mmol/L (11-19 @ 05:53)  Sodium: 145 mmol/L (11-18 @ 20:40)      0.42 mg/dL 11-20 @ 08:00  0.44 mg/dL 11-19 @ 05:53  0.49 mg/dL 11-18 @ 20:40      Hemoglobin:  Hemoglobin: 12.2 g/dL (11-20 @ 08:00)  Hemoglobin: 12.1 g/dL (11-19 @ 05:53)  Hemoglobin: 12.5 g/dL (11-18 @ 20:40)      Platelets: Platelet Count - Automated: 371 K/uL (11-20 @ 08:00)  Platelet Count - Automated: 351 K/uL (11-19 @ 05:53)  Platelet Count - Automated: 376 K/uL (11-18 @ 20:40)          Urinalysis Basic - ( 20 Nov 2023 08:00 )    Color: x / Appearance: x / SG: x / pH: x  Gluc: 136 mg/dL / Ketone: x  / Bili: x / Urobili: x   Blood: x / Protein: x / Nitrite: x   Leuk Esterase: x / RBC: x / WBC x   Sq Epi: x / Non Sq Epi: x / Bacteria: x        RADIOLOGY & ADDITIONAL STUDIES:      MICROBIOLOGY:  RECENT CULTURES:  11-20 .Sputum Sputum XXXX   Numerous polymorphonuclear leukocytes per low power field  Rare Squamous epithelial cells per low power field  Numerous Gram Positive Cocci in Pairs and Chains per oil power field  Rare Gram Positive Cocci in Clusters per oil power field  Rare GramNegative Rods per oil power field XXXX    11-18 .Blood Blood-Peripheral XXXX XXXX   No growth at 48 Hours

## 2023-11-21 NOTE — PROGRESS NOTE ADULT - PROBLEM SELECTOR PROBLEM 1
Acute bronchitis with asthma with acute exacerbation
Acute bronchitis with asthma with acute exacerbation

## 2023-11-21 NOTE — PROGRESS NOTE ADULT - PROBLEM SELECTOR PLAN 1
Continue iv solumedrol 40mg q6h, iv Zithromax 500mg qd  Duonebs q6h  Symbicort bid  Cough suppressants as needed  Oxygen prn  Pulmonary f/u  Further work-up/management pending clinical course.
Clinically improved  Change to po prednisone 30mg and taper over 6 days  S/P zithromax x 5 days  Symbicort bid  Cough suppressants as needed  Oxygen prn  Pulmonary f/u  Further work-up/management pending clinical course.

## 2023-11-22 DIAGNOSIS — J45.41 MODERATE PERSISTENT ASTHMA WITH (ACUTE) EXACERBATION: ICD-10-CM

## 2023-11-22 LAB
CULTURE RESULTS: ABNORMAL
CULTURE RESULTS: ABNORMAL
SPECIMEN SOURCE: SIGNIFICANT CHANGE UP
SPECIMEN SOURCE: SIGNIFICANT CHANGE UP

## 2023-11-22 RX ORDER — MONTELUKAST 10 MG/1
10 TABLET, FILM COATED ORAL
Qty: 90 | Refills: 2 | Status: ACTIVE | COMMUNITY
Start: 2023-11-22 | End: 1900-01-01

## 2023-11-24 LAB
CULTURE RESULTS: SIGNIFICANT CHANGE UP
SPECIMEN SOURCE: SIGNIFICANT CHANGE UP

## 2024-01-24 ENCOUNTER — APPOINTMENT (OUTPATIENT)
Dept: PULMONOLOGY | Facility: CLINIC | Age: 44
End: 2024-01-24
Payer: COMMERCIAL

## 2024-01-24 VITALS
TEMPERATURE: 98.1 F | HEART RATE: 96 BPM | DIASTOLIC BLOOD PRESSURE: 77 MMHG | OXYGEN SATURATION: 96 % | WEIGHT: 251 LBS | SYSTOLIC BLOOD PRESSURE: 127 MMHG | BODY MASS INDEX: 43.08 KG/M2

## 2024-01-24 DIAGNOSIS — J45.50 SEVERE PERSISTENT ASTHMA, UNCOMPLICATED: ICD-10-CM

## 2024-01-24 PROCEDURE — 99204 OFFICE O/P NEW MOD 45 MIN: CPT

## 2024-01-24 NOTE — CONSULT LETTER
[Dear  ___] : Dear  [unfilled], [Consult Letter:] : I had the pleasure of evaluating your patient, [unfilled]. [Please see my note below.] : Please see my note below. [Consult Closing:] : Thank you very much for allowing me to participate in the care of this patient.  If you have any questions, please do not hesitate to contact me. [FreeTextEntry3] : Yours truly,  Dustin Salter MD

## 2024-01-24 NOTE — DISCUSSION/SUMMARY
[FreeTextEntry1] :    ___________ PATIENT DATA ______________ MARLA ARRINGTON .    AGE.   43  .  1980  SEX.    f  CONTACT. [phone] PCP. SCAR BROWN  REFERRING MD. SCAR BROWN  INITIAL VISIT DATE . 2024 SMOKING. . 2024 Smoked for 5 y after high school 1/2 ppd  FAMILY HX. . 2024 Is adopted does not know   BIRTHPLACE.   . [place]  . Came to [place] age 4 OCCUPATION. . Nurse  PETS/ENVIRONMENTAL. . 2024 Dog and cat  ALLERGY.  . 2024 Gluten Codeine  HISTORY.  . Initial visit on 2024  Pt was seen during hosp stay 2023   . 42-year-old female with past medical history of asthma without intubations, breast cancer,  anemia, hernias, leiomyosarcoma presents with cough for 2 weeks.  Patient reports cough with sputum production with associated wheezing.  Patient took Medrol Dosepak, doxycycline, nebs and albuterol inhaler which provided no relief.  Patient works at nursing facility  . Recent uv rxed with doxy and medrol started 2 w ago   . 2023 is on her second medrol pack  . 2024 Pt was managed as flareup of asthma and came here to establish pulm care   . In [place] since age 4 Born [place] . Quit smoking 20 y 1/2 ppd  . Has cat and dog . ALLERGY Codeine gluten   ______                                  MEDS. ____________ . 2024  . SYMBICORT 160 (2024 says she stopped using it on her own a few d ago)  . SINGULAIR  . NEURONTIN 600.3  ___________ PROBLEM DETAILS. ___________ .. asthma  .. Abdominal hernia  .. Anemia  .. Breast cancer Right lumpectomy 2014, S/P radiation, last dose, 2014 .. Celiac disease  .. Leiomyosarcoma of lower extremity, left left ankle .. Obesity  .. Polio 3 y/o. .. has had covid x 4 times  last    _____ IMMUNIZATION ______ RECOMMENDATIONS. . FLU. .. Reminded to take flu vaccine q fall . COVID. .. Reminded to stay up to date with covid vaccines/boosters as they are released IMMUNIZATION DATES. . INFLUENZA . .. 2024 Took flu shit 10/2023 . PNEUMONIA . .. 2024 Has taken pneumonia shot several y ago . COVID. .. 2024 has had 3 covid shots

## 2024-01-24 NOTE — ASSESSMENT
[FreeTextEntry1] :   ___________ HOME OXYGEN. ___________ . 1/24/2024 ra rest 96% . Does not require home oxygen   _____ OBESITY. _____ .. 1/24/2024 251  .. 1/24/2024 bmi 43 .. advised wt loss ____ SMOKING. ____ . Smoked x 5 y after high school 1/2 ppd   ____ LUNG CANCER SCREENING.  ____ . DOES NOT MEET  .  ELIGIBILITY CRITERIA   __________ ASTHMA ____________ . HISTORY  . Initial visit on 1/24/2024  Pt was seen during hosp stay 11/18/2023   . 42-year-old female with past medical history of asthma without intubations, breast cancer,  anemia, hernias, leiomyosarcoma presents with cough for 2 weeks.  Patient reports cough with sputum production with associated wheezing.  Patient took Medrol Dosepak, doxycycline, nebs and albuterol inhaler which provided no relief.  Patient works at nursing facility  . Recent uv rxed with doxy and medrol started 2 w ago   . 11/18/2023 is on her second medrol pack  . 1/24/2024 Pt was managed as flareup of asthma and came here to establish pulm care   . In [place] age 4 Born [place] . Quit smoking 20 y 1/2 ppd  . Has cat and dog . ALLERGY Codeine gluten     ONSET. .. Asthma started when she got adopted age 4 HOSPITAL STAYS. .. 2020 [facility] .. 6/2022 hosp [facility] .. 11/2022 COVID and asthma  .. 11/2023 asthma ex   INTUBATIONS. .. 1/24/2024 Never intubated for asthma  EXACERBATIONS. .. 1/24/2024 As above  FAMILY HISTORY. .. 1/24/2024 [facility] adopted  PETS. .. 1/24/2024 dog and cat  HABITS.  .. 1/24/2024 smokerd 5 y as teenager 1/2 ppd  HO ATOPY. --  Eczema. 1/24/2024 No -- Sinusitis. 1/24/2024 No -- Hayfever.1/24/2024 no   PE. RHINITIS. .. 1/24/2024 No THRUSH. .. 1/24/2024 No WHEEZE. .. 1/24/2024 No  PEDAL EDEMA. .. 1/24/2024 l leg sp surgery from  lieomyosarcoma and has had pi=olio as child r foot so both feet tend to remain swollen  .. 11/18/2023 venous duplx (-)     IDENTIFIED TRIGGERS.  .. 1/24/2024 Usually viral infections triggerit  COMORBIDITY. Obesity;  1/24/2024 Yes  Chronic rhinosinusitis;  1/24/2024 no  Gastroesophageal reflux disease;  1/24/2024 yes  Confirmed food allergy; 1/24/2024 no  Anxiety; 1/24/2024 no Depression;  1/24/2024 No  Pregnancy. 1/24/2024 Has had jordyn   .  ASSESSMENT RECOMMENDATIONS . 1/24/2024 Patient has severe asthma required multiple admissions in last 2 years  . 1/24/2024 She stopped using symbicort 2 weeks ago thought is is only needed when she gets attack . 1/24/2024 No noct awakening sec asthmas last 2 weeks and did not need rescue treatment  . 1/24/2024 asthma gets triggered when she gets a cold  . 1/24/2024 Prior to COVID asthma was infrequent Has had 4 covids since 2019  . 1/24/2024 Restart symbicoirt 160 2p bid and 1p q 4 h prn  . 1/24/2024 Continue mointelukast     TIME SPENT .. A total of 55 minutes were spent during this patient visit with various face to face as well as non face to face activities such as data mining medical decision making placing orders explaining plan risks benefits alternatives etc

## 2024-02-28 ENCOUNTER — APPOINTMENT (OUTPATIENT)
Dept: PULMONOLOGY | Facility: CLINIC | Age: 44
End: 2024-02-28

## 2025-01-11 NOTE — ED PROVIDER NOTE - NS ED MD DISPO DISCHARGE
Patient to triage desk to inquire about results. Updated that still waiting on one more xray and then we can get provider to give all results.    Home